# Patient Record
Sex: MALE | Race: WHITE | NOT HISPANIC OR LATINO | Employment: OTHER | URBAN - METROPOLITAN AREA
[De-identification: names, ages, dates, MRNs, and addresses within clinical notes are randomized per-mention and may not be internally consistent; named-entity substitution may affect disease eponyms.]

---

## 2017-02-20 ENCOUNTER — TELEPHONE (OUTPATIENT)
Dept: CARDIOLOGY | Facility: CLINIC | Age: 82
End: 2017-02-20

## 2017-02-20 DIAGNOSIS — I10 HTN (HYPERTENSION), BENIGN: Primary | ICD-10-CM

## 2017-02-20 NOTE — TELEPHONE ENCOUNTER
Has appointment march 14th. Wants to know if you are going to orders labs because he does them in [place].

## 2017-02-20 NOTE — TELEPHONE ENCOUNTER
----- Message from Hannah Cunningham sent at 2/20/2017 11:53 AM CST -----  Contact: [phone] or [phone] / self   Patient requesting to speak with you regarding his blood pressure medication. It is too strong sometimes in the morning. Please advise.

## 2017-02-21 NOTE — TREATMENT PLAN
Patient called regarding low BP after taking medications that leaves him feeling drained and weak. He is taking lisinopril 20 mg po daily, HCTZ 25 mg po daily, norvasc 5 mg po daily and bisoprolol 5 mg po daily. Patient advice to stop HCTZ. Will get BMP. F/u next month.

## 2017-03-09 ENCOUNTER — LAB VISIT (OUTPATIENT)
Dept: LAB | Facility: HOSPITAL | Age: 82
End: 2017-03-09
Attending: INTERNAL MEDICINE
Payer: MEDICARE

## 2017-03-09 DIAGNOSIS — I10 HTN (HYPERTENSION), BENIGN: ICD-10-CM

## 2017-03-09 LAB
ANION GAP SERPL CALC-SCNC: 9 MMOL/L
BUN SERPL-MCNC: 19 MG/DL
CALCIUM SERPL-MCNC: 9 MG/DL
CHLORIDE SERPL-SCNC: 107 MMOL/L
CO2 SERPL-SCNC: 28 MMOL/L
CREAT SERPL-MCNC: 1.21 MG/DL
EST. GFR  (AFRICAN AMERICAN): >60 ML/MIN/1.73 M^2
EST. GFR  (NON AFRICAN AMERICAN): 55.8 ML/MIN/1.73 M^2
GLUCOSE SERPL-MCNC: 110 MG/DL
POTASSIUM SERPL-SCNC: 4.2 MMOL/L
SODIUM SERPL-SCNC: 144 MMOL/L

## 2017-03-09 PROCEDURE — 36415 COLL VENOUS BLD VENIPUNCTURE: CPT | Mod: PO

## 2017-03-09 PROCEDURE — 80048 BASIC METABOLIC PNL TOTAL CA: CPT | Mod: PO

## 2017-03-14 ENCOUNTER — OFFICE VISIT (OUTPATIENT)
Dept: CARDIOLOGY | Facility: CLINIC | Age: 82
End: 2017-03-14
Payer: MEDICARE

## 2017-03-14 VITALS
SYSTOLIC BLOOD PRESSURE: 114 MMHG | DIASTOLIC BLOOD PRESSURE: 66 MMHG | WEIGHT: 186.63 LBS | HEIGHT: 68 IN | BODY MASS INDEX: 28.28 KG/M2

## 2017-03-14 DIAGNOSIS — I51.89 DIASTOLIC DYSFUNCTION: ICD-10-CM

## 2017-03-14 DIAGNOSIS — E78.5 HYPERLIPIDEMIA, UNSPECIFIED HYPERLIPIDEMIA TYPE: ICD-10-CM

## 2017-03-14 DIAGNOSIS — I10 ESSENTIAL HYPERTENSION: Primary | ICD-10-CM

## 2017-03-14 DIAGNOSIS — I63.9 CEREBRAL INFARCTION, UNSPECIFIED MECHANISM: ICD-10-CM

## 2017-03-14 DIAGNOSIS — R73.9 HYPERGLYCEMIA: ICD-10-CM

## 2017-03-14 PROCEDURE — 99214 OFFICE O/P EST MOD 30 MIN: CPT | Mod: S$PBB,,, | Performed by: INTERNAL MEDICINE

## 2017-03-14 PROCEDURE — 99999 PR PBB SHADOW E&M-EST. PATIENT-LVL III: CPT | Mod: PBBFAC,,, | Performed by: INTERNAL MEDICINE

## 2017-03-14 PROCEDURE — 99213 OFFICE O/P EST LOW 20 MIN: CPT | Mod: PBBFAC,PO | Performed by: INTERNAL MEDICINE

## 2017-03-14 RX ORDER — HYDROCHLOROTHIAZIDE 25 MG/1
25 TABLET ORAL DAILY
Qty: 30 TABLET | Refills: 11 | Status: SHIPPED | OUTPATIENT
Start: 2017-03-14 | End: 2017-04-11 | Stop reason: SDUPTHER

## 2017-03-14 RX ORDER — PANTOPRAZOLE SODIUM 40 MG/1
TABLET, DELAYED RELEASE ORAL
COMMUNITY
Start: 2017-03-09 | End: 2017-08-10

## 2017-03-14 RX ORDER — ROSUVASTATIN CALCIUM 10 MG/1
10 TABLET, COATED ORAL DAILY
Qty: 90 TABLET | Refills: 3 | Status: SHIPPED | OUTPATIENT
Start: 2017-03-14 | End: 2017-11-27

## 2017-03-14 NOTE — MR AVS SNAPSHOT
[facility]  [address], [address]  [place] [address]  Phone: [phone]                  Herber Peña   3/14/2017 10:00 AM   Office Visit    Description:  Male : 1935   Provider:  Shazia Landry MD   Department:  [facility]           Reason for Visit     Follow-up           Diagnoses this Visit        Comments    Essential hypertension    -  Primary     Cerebral infarction, unspecified mechanism         Hyperlipidemia, unspecified hyperlipidemia type         Diastolic dysfunction         Hyperglycemia                To Do List           Goals (5 Years of Data)     None       These Medications        Disp Refills Start End    rosuvastatin (CRESTOR) 10 MG tablet 90 tablet 3 3/14/2017 2017    Take 1 tablet (10 mg total) by mouth once daily. - Oral    Pharmacy: [facility]/pharmacy #[id number] - [place], [place] - [address] Ph #: [phone]       hydrochlorothiazide (HYDRODIURIL) 25 MG tablet 30 tablet 11 3/14/2017 3/14/2018    Take 1 tablet (25 mg total) by mouth once daily. - Oral    Pharmacy: [facility]/pharmacy #[id number] - [place], [place] - [address] [address] Ph #: [phone]         [facility] On [facility]     [facility] Care Line -  Assistance  Registered nurses in the [facility] provide clinical advisement, health education, appointment booking, and other advisory services.  Call for this free service at [phone].             Medications           Message regarding Medications     Verify the changes and/or additions to your medication regime listed below are the same as discussed with your clinician today.  If any of these changes or additions are incorrect, please notify your healthcare provider.        START taking these NEW medications        Refills    hydrochlorothiazide (HYDRODIURIL) 25 MG tablet 11    Sig: Take 1 tablet (25 mg total) by mouth once daily.    Class: Normal    Route: Oral           Verify that the below list of medications  "is an accurate representation of the medications you are currently taking.  If none reported, the list may be blank. If incorrect, please contact your healthcare provider. Carry this list with you in case of emergency.           Current Medications     alprazolam (XANAX) 2 MG tablet Take 2 mg by mouth nightly as needed.      amlodipine (NORVASC) 5 MG tablet Take 1 tablet (5 mg total) by mouth once daily.    bisoprolol (ZEBETA) 5 MG tablet Take 1 tablet (5 mg total) by mouth every morning.    citalopram (CELEXA) 40 MG tablet Take 60 mg by mouth once daily.      clopidogrel (PLAVIX) 75 mg tablet TAKE 1 TABLET DAILY    hydrochlorothiazide (HYDRODIURIL) 25 MG tablet Take 1 tablet (25 mg total) by mouth once daily.    lisinopril (PRINIVIL,ZESTRIL) 20 MG tablet Take 1 tablet (20 mg total) by mouth once daily.    NEXIUM 40 mg capsule Take 40 mg by mouth Daily.    pantoprazole (PROTONIX) 40 MG tablet     quetiapine (SEROQUEL XR) 300 MG Tb24 Take by mouth once daily.      rosuvastatin (CRESTOR) 10 MG tablet Take 1 tablet (10 mg total) by mouth once daily.    trazodone (DESYREL) 100 MG tablet            Clinical Reference Information           Your Vitals Were     BP Height Weight BMI       114/66 5' 8" (1.727 m) 84.6 kg (186 lb 9.6 oz) 28.37 kg/m2       Blood Pressure          Most Recent Value    BP  114/66      Allergies as of 3/14/2017     Codeine      Immunizations Administered on Date of Encounter - 3/14/2017     None      [facility] Sign-Up     Activating your [facility] account is as easy as 1-2-3!     1) Visit [email or web address], select Sign Up Now, enter this activation code and your date of birth, then select Next.  [email or web address]  Expires: 4/28/2017 10:28 AM      2) Create a username and password to use when you visit [facility] in the future and select a security question in case you lose your password and select Next.    3) Enter your e-mail address and click Sign Up!    Additional Information  If you have " questions, please e-mail [email or web address] or call [phone] to talk to our [facility] staff. Remember, [facility] is NOT to be used for urgent needs. For medical emergencies, dial 911.         Language Assistance Services     ATTENTION: Language assistance services are available, free of charge. Please call [phone].      ATENCIÓN: Si habla español, tiene a oleary disposición servicios gratuitos de asistencia lingüística. Llame al [phone].     CHÚ Ý: N?u b?n nói Ti?ng Vi?t, có các d?ch v? h? tr? ngôn ng? mi?n phí dành cho b?n. G?i s? [phone].         [facility] complies with applicable Federal civil rights laws and does not discriminate on the basis of race, color, national origin, age, disability, or sex.

## 2017-03-14 NOTE — PROGRESS NOTES
Subjective:   Patient ID:  Herber Peña is a 81 y.o. male who presents for follow-up of Follow-up      Problem List Items Addressed This Visit        Neuro    Cerebral infarction       Cardiac    HTN (hypertension) - Primary       Fluids/Electrolytes/Nutrition/GI    Hyperlipidemia       Other    Diastolic dysfunction          HPI: Patient is doing well with no acute complaints. He is here for f/u from history of CVA. He was having weakness on multiple BP medications and was advice to stop HCTZ. He said he started feeling better so he did not stop any of his medications. He is being treated for depression/anxiety.     No history of MI/CAD or stents. He denies chest pain or SOB. BP is controlled.     He try to stay active by gardening and going walking.     Review of Systems   Constitution: Negative.   HENT: Negative.    Eyes: Negative.    Cardiovascular: Negative.    Respiratory: Negative.    Endocrine: Negative.    Hematologic/Lymphatic: Negative.    Skin: Negative.    Musculoskeletal: Negative.    Gastrointestinal: Negative.    Neurological: Negative.    Psychiatric/Behavioral: Negative.        Patient's Medications   New Prescriptions    No medications on file   Previous Medications    ALPRAZOLAM (XANAX) 2 MG TABLET    Take 2 mg by mouth nightly as needed.      AMLODIPINE (NORVASC) 5 MG TABLET    Take 1 tablet (5 mg total) by mouth once daily.    BISOPROLOL (ZEBETA) 5 MG TABLET    Take 1 tablet (5 mg total) by mouth every morning.    CITALOPRAM (CELEXA) 40 MG TABLET    Take 60 mg by mouth once daily.      CLOPIDOGREL (PLAVIX) 75 MG TABLET    TAKE 1 TABLET DAILY    LISINOPRIL (PRINIVIL,ZESTRIL) 20 MG TABLET    Take 1 tablet (20 mg total) by mouth once daily.    NEXIUM 40 MG CAPSULE    Take 40 mg by mouth Daily.    PANTOPRAZOLE (PROTONIX) 40 MG TABLET        QUETIAPINE (SEROQUEL XR) 300 MG TB24    Take by mouth once daily.      ROSUVASTATIN (CRESTOR) 10 MG TABLET    Take 1 tablet (10 mg total) by mouth once  daily.    TRAZODONE (DESYREL) 100 MG TABLET       Modified Medications    No medications on file   Discontinued Medications    No medications on file       Objective:   Physical Exam   Constitutional: He is oriented to person, place, and time. He appears well-developed and well-nourished. No distress.   Examination of the digits showed no clubbing or cyanosis   HENT:   Head: Normocephalic and atraumatic.   Eyes: Conjunctivae are normal. Pupils are equal, round, and reactive to light. Right eye exhibits no discharge.   Neck: Normal range of motion. Neck supple. No JVD present. No thyromegaly present.   No carotid bruits   Cardiovascular: Normal rate, regular rhythm, S1 normal, S2 normal, normal heart sounds, intact distal pulses and normal pulses.  PMI is not displaced.  Exam reveals no gallop, no friction rub and no opening snap.    No murmur heard.  Pulmonary/Chest: Effort normal and breath sounds normal. No respiratory distress. He has no wheezes. He has no rales. He exhibits no tenderness.   Abdominal: Soft. Bowel sounds are normal. He exhibits no distension and no mass. There is no tenderness. There is no guarding.   No hepatosplenomegaly   Musculoskeletal: Normal range of motion. He exhibits no edema or tenderness.   Lymphadenopathy:     He has no cervical adenopathy.   Neurological: He is alert and oriented to person, place, and time.   Skin: Skin is warm. No rash noted. He is not diaphoretic. No erythema.   Psychiatric: He has a normal mood and affect.   Nursing note and vitals reviewed.      ECGs reviewed  LABS reviewed  Imaging including Echoes reviewed- normal ef with DD    Assessment:     1. Essential hypertension    2. Cerebral infarction, unspecified mechanism    3. Hyperlipidemia, unspecified hyperlipidemia type    4. Diastolic dysfunction        Plan:     Continue with current medications  Low salt diet  Activity as tolerated  F/u in 6 months

## 2017-04-11 RX ORDER — HYDROCHLOROTHIAZIDE 25 MG/1
25 TABLET ORAL DAILY
Qty: 90 TABLET | Refills: 3 | Status: SHIPPED | OUTPATIENT
Start: 2017-04-11 | End: 2017-08-10

## 2017-05-10 RX ORDER — CLOPIDOGREL BISULFATE 75 MG/1
75 TABLET ORAL DAILY
Qty: 90 TABLET | Refills: 2 | Status: SHIPPED | OUTPATIENT
Start: 2017-05-10 | End: 2017-09-01 | Stop reason: SDUPTHER

## 2017-06-20 ENCOUNTER — OFFICE VISIT (OUTPATIENT)
Dept: CARDIOLOGY | Facility: CLINIC | Age: 82
End: 2017-06-20
Payer: MEDICARE

## 2017-06-20 VITALS
DIASTOLIC BLOOD PRESSURE: 65 MMHG | HEIGHT: 68 IN | SYSTOLIC BLOOD PRESSURE: 132 MMHG | OXYGEN SATURATION: 96 % | HEART RATE: 51 BPM | WEIGHT: 181.13 LBS | BODY MASS INDEX: 27.45 KG/M2

## 2017-06-20 DIAGNOSIS — I63.9 CEREBRAL INFARCTION, UNSPECIFIED MECHANISM: Primary | ICD-10-CM

## 2017-06-20 DIAGNOSIS — I51.89 DIASTOLIC DYSFUNCTION: ICD-10-CM

## 2017-06-20 DIAGNOSIS — E78.2 MIXED HYPERLIPIDEMIA: ICD-10-CM

## 2017-06-20 DIAGNOSIS — I10 ESSENTIAL HYPERTENSION: ICD-10-CM

## 2017-06-20 PROCEDURE — 99999 PR PBB SHADOW E&M-EST. PATIENT-LVL III: CPT | Mod: PBBFAC,,, | Performed by: INTERNAL MEDICINE

## 2017-06-20 PROCEDURE — 1126F AMNT PAIN NOTED NONE PRSNT: CPT | Mod: ,,, | Performed by: INTERNAL MEDICINE

## 2017-06-20 PROCEDURE — 1159F MED LIST DOCD IN RCRD: CPT | Mod: ,,, | Performed by: INTERNAL MEDICINE

## 2017-06-20 PROCEDURE — 99214 OFFICE O/P EST MOD 30 MIN: CPT | Mod: S$PBB,,, | Performed by: INTERNAL MEDICINE

## 2017-06-20 PROCEDURE — 99213 OFFICE O/P EST LOW 20 MIN: CPT | Mod: PBBFAC,PO | Performed by: INTERNAL MEDICINE

## 2017-06-20 NOTE — PROGRESS NOTES
Subjective:   Patient ID:  Herber Peña is a 81 y.o. male who presents for follow-up of Follow-up      Problem List Items Addressed This Visit        Neuro    Cerebral infarction - Primary       Cardiac    HTN (hypertension)       Fluids/Electrolytes/Nutrition/GI    Hyperlipidemia       Other    Diastolic dysfunction      Other Visit Diagnoses    None.         HPI: Patient is doing well with no acute complaints. He is here for f/u from history of CVA.  He denies chest pain or SOB. BP is controlled. He is currently taking care of wife that is suffering from dementia.   He try to stay active by gardening and going walking.   He is compliant with medications.  No orthopnea or PND.     Review of Systems   Constitution: Negative.   HENT: Negative.    Eyes: Negative.    Cardiovascular: Negative.    Respiratory: Negative.    Endocrine: Negative.    Hematologic/Lymphatic: Negative.    Skin: Negative.    Musculoskeletal: Negative.    Gastrointestinal: Negative.    Neurological: Negative.    Psychiatric/Behavioral: Negative.        Patient's Medications   New Prescriptions    No medications on file   Previous Medications    ALPRAZOLAM (XANAX) 2 MG TABLET    Take 2 mg by mouth nightly as needed.      AMLODIPINE (NORVASC) 5 MG TABLET    Take 1 tablet (5 mg total) by mouth once daily.    BISOPROLOL (ZEBETA) 5 MG TABLET    Take 1 tablet (5 mg total) by mouth every morning.    CITALOPRAM (CELEXA) 40 MG TABLET    Take 60 mg by mouth once daily.      CLOPIDOGREL (PLAVIX) 75 MG TABLET    Take 1 tablet (75 mg total) by mouth once daily.    HYDROCHLOROTHIAZIDE (HYDRODIURIL) 25 MG TABLET    Take 1 tablet (25 mg total) by mouth once daily.    LISINOPRIL (PRINIVIL,ZESTRIL) 20 MG TABLET    Take 1 tablet (20 mg total) by mouth once daily.    NEXIUM 40 MG CAPSULE    Take 40 mg by mouth Daily.    PANTOPRAZOLE (PROTONIX) 40 MG TABLET        QUETIAPINE (SEROQUEL XR) 300 MG TB24    Take by mouth once daily.      ROSUVASTATIN (CRESTOR) 10 MG  TABLET    Take 1 tablet (10 mg total) by mouth once daily.    TRAZODONE (DESYREL) 100 MG TABLET       Modified Medications    No medications on file   Discontinued Medications    No medications on file       Objective:   Physical Exam   Constitutional: He is oriented to person, place, and time. He appears well-developed and well-nourished. No distress.   Examination of the digits showed no clubbing or cyanosis   HENT:   Head: Normocephalic and atraumatic.   Eyes: Conjunctivae are normal. Pupils are equal, round, and reactive to light. Right eye exhibits no discharge.   Neck: Normal range of motion. Neck supple. No JVD present. No thyromegaly present.   No carotid bruits   Cardiovascular: Normal rate, regular rhythm, S1 normal, S2 normal, normal heart sounds, intact distal pulses and normal pulses.  PMI is not displaced.  Exam reveals no gallop, no friction rub and no opening snap.    No murmur heard.  Pulmonary/Chest: Effort normal and breath sounds normal. No respiratory distress. He has no wheezes. He has no rales. He exhibits no tenderness.   Abdominal: Soft. Bowel sounds are normal. He exhibits no distension and no mass. There is no tenderness. There is no guarding.   No hepatosplenomegaly   Musculoskeletal: Normal range of motion. He exhibits no edema or tenderness.   Lymphadenopathy:     He has no cervical adenopathy.   Neurological: He is alert and oriented to person, place, and time.   Skin: Skin is warm. No rash noted. He is not diaphoretic. No erythema.   Psychiatric: He has a normal mood and affect.   Nursing note and vitals reviewed.      ECGs reviewed- sinus bradycardia  LABS reviewed  Imaging including Echoes reviewed- normal ef with DD    Assessment:     1. Cerebral infarction, unspecified mechanism    2. Essential hypertension    3. Mixed hyperlipidemia    4. Diastolic dysfunction        Plan:     Continue with current medications  Low salt diet  Activity as tolerated  F/u in 6 months

## 2017-07-05 ENCOUNTER — TELEPHONE (OUTPATIENT)
Dept: CARDIOLOGY | Facility: CLINIC | Age: 82
End: 2017-07-05

## 2017-07-05 NOTE — TELEPHONE ENCOUNTER
----- Message from Davina Nash sent at 7/5/2017  9:39 AM CDT -----  April with Dr. Manuel's office, DDS, called.    No. [phone]   Can patient discontinue taking Plavix for a procedure.    Please call.

## 2017-07-28 RX ORDER — BISOPROLOL FUMARATE 5 MG/1
5 TABLET, FILM COATED ORAL EVERY MORNING
Qty: 90 TABLET | Refills: 3 | Status: SHIPPED | OUTPATIENT
Start: 2017-07-28 | End: 2017-08-10

## 2017-07-28 RX ORDER — LISINOPRIL 20 MG/1
20 TABLET ORAL DAILY
Qty: 90 TABLET | Refills: 3 | Status: SHIPPED | OUTPATIENT
Start: 2017-07-28 | End: 2017-08-10 | Stop reason: SDUPTHER

## 2017-07-28 RX ORDER — AMLODIPINE BESYLATE 5 MG/1
5 TABLET ORAL DAILY
Qty: 90 TABLET | Refills: 3 | Status: SHIPPED | OUTPATIENT
Start: 2017-07-28 | End: 2017-09-01 | Stop reason: SDUPTHER

## 2017-08-10 ENCOUNTER — OFFICE VISIT (OUTPATIENT)
Dept: CARDIOLOGY | Facility: CLINIC | Age: 82
End: 2017-08-10
Payer: MEDICARE

## 2017-08-10 VITALS
OXYGEN SATURATION: 96 % | HEIGHT: 68 IN | HEART RATE: 45 BPM | WEIGHT: 177.31 LBS | SYSTOLIC BLOOD PRESSURE: 152 MMHG | DIASTOLIC BLOOD PRESSURE: 71 MMHG | BODY MASS INDEX: 26.87 KG/M2

## 2017-08-10 DIAGNOSIS — I51.89 DIASTOLIC DYSFUNCTION: ICD-10-CM

## 2017-08-10 DIAGNOSIS — F33.40 RECURRENT MAJOR DEPRESSIVE DISORDER, IN REMISSION: ICD-10-CM

## 2017-08-10 DIAGNOSIS — E78.2 MIXED HYPERLIPIDEMIA: ICD-10-CM

## 2017-08-10 DIAGNOSIS — I10 ESSENTIAL HYPERTENSION: Primary | ICD-10-CM

## 2017-08-10 PROCEDURE — 99214 OFFICE O/P EST MOD 30 MIN: CPT | Mod: S$PBB,,, | Performed by: INTERNAL MEDICINE

## 2017-08-10 PROCEDURE — 3077F SYST BP >= 140 MM HG: CPT | Mod: ,,, | Performed by: INTERNAL MEDICINE

## 2017-08-10 PROCEDURE — 1159F MED LIST DOCD IN RCRD: CPT | Mod: ,,, | Performed by: INTERNAL MEDICINE

## 2017-08-10 PROCEDURE — 99999 PR PBB SHADOW E&M-EST. PATIENT-LVL III: CPT | Mod: PBBFAC,,, | Performed by: INTERNAL MEDICINE

## 2017-08-10 PROCEDURE — 3078F DIAST BP <80 MM HG: CPT | Mod: ,,, | Performed by: INTERNAL MEDICINE

## 2017-08-10 PROCEDURE — 1126F AMNT PAIN NOTED NONE PRSNT: CPT | Mod: ,,, | Performed by: INTERNAL MEDICINE

## 2017-08-10 PROCEDURE — 99213 OFFICE O/P EST LOW 20 MIN: CPT | Mod: PBBFAC,PO | Performed by: INTERNAL MEDICINE

## 2017-08-10 RX ORDER — LISINOPRIL 40 MG/1
40 TABLET ORAL DAILY
Qty: 90 TABLET | Refills: 3 | Status: SHIPPED | OUTPATIENT
Start: 2017-08-10 | End: 2017-09-01 | Stop reason: SDUPTHER

## 2017-08-10 NOTE — PROGRESS NOTES
Subjective:   Patient ID:  Herber Peña is a 81 y.o. male who presents for follow-up of Follow-up      Problem List Items Addressed This Visit        Psychiatric    Depression       Cardiac/Vascular    HTN (hypertension) - Primary    Hyperlipidemia    Diastolic dysfunction      Other Visit Diagnoses    None.         HPI: Patient is doing well with no acute complaints. He is here for f/u from history of CVA.  He denies chest pain or SOB. BP is controlled.     He is currently taking care of wife that is suffering from dementia and depression  He is active working in yard and taking care of wife.    He is compliant with medications.  No orthopnea or PND.     Review of Systems   Constitution: Negative.   HENT: Negative.    Eyes: Negative.    Cardiovascular: Negative.    Respiratory: Negative.    Endocrine: Negative.    Hematologic/Lymphatic: Negative.    Skin: Negative.    Musculoskeletal: Negative.    Gastrointestinal: Negative.    Neurological: Negative.    Psychiatric/Behavioral: Negative.        Patient's Medications   New Prescriptions    No medications on file   Previous Medications    ALPRAZOLAM (XANAX) 2 MG TABLET    Take 2 mg by mouth nightly as needed.      AMLODIPINE (NORVASC) 5 MG TABLET    Take 1 tablet (5 mg total) by mouth once daily.    BISOPROLOL (ZEBETA) 5 MG TABLET    Take 1 tablet (5 mg total) by mouth every morning.    CITALOPRAM (CELEXA) 40 MG TABLET    Take 60 mg by mouth once daily.      CLOPIDOGREL (PLAVIX) 75 MG TABLET    Take 1 tablet (75 mg total) by mouth once daily.    HYDROCHLOROTHIAZIDE (HYDRODIURIL) 25 MG TABLET    Take 1 tablet (25 mg total) by mouth once daily.    LISINOPRIL (PRINIVIL,ZESTRIL) 20 MG TABLET    Take 1 tablet (20 mg total) by mouth once daily.    NEXIUM 40 MG CAPSULE    Take 40 mg by mouth Daily.    PANTOPRAZOLE (PROTONIX) 40 MG TABLET        ROSUVASTATIN (CRESTOR) 10 MG TABLET    Take 1 tablet (10 mg total) by mouth once daily.    TRAZODONE (DESYREL) 100 MG TABLET        Modified Medications    No medications on file   Discontinued Medications    QUETIAPINE (SEROQUEL XR) 300 MG TB24    Take by mouth once daily.         Objective:   Physical Exam   Constitutional: He is oriented to person, place, and time. He appears well-developed and well-nourished. No distress.   Examination of the digits showed no clubbing or cyanosis   HENT:   Head: Normocephalic and atraumatic.   Eyes: Conjunctivae are normal. Pupils are equal, round, and reactive to light. Right eye exhibits no discharge.   Neck: Normal range of motion. Neck supple. No JVD present. No thyromegaly present.   No carotid bruits   Cardiovascular: Normal rate, regular rhythm, S1 normal, S2 normal, normal heart sounds, intact distal pulses and normal pulses.  PMI is not displaced.  Exam reveals no gallop, no friction rub and no opening snap.    No murmur heard.  Pulmonary/Chest: Effort normal and breath sounds normal. No respiratory distress. He has no wheezes. He has no rales. He exhibits no tenderness.   Abdominal: Soft. Bowel sounds are normal. He exhibits no distension and no mass. There is no tenderness. There is no guarding.   No hepatosplenomegaly   Musculoskeletal: Normal range of motion. He exhibits no edema or tenderness.   Lymphadenopathy:     He has no cervical adenopathy.   Neurological: He is alert and oriented to person, place, and time.   Skin: Skin is warm. No rash noted. He is not diaphoretic. No erythema.   Psychiatric: He has a normal mood and affect.   Nursing note and vitals reviewed.      ECGs reviewed- sinus bradycardia  LABS reviewed  Imaging including Echoes reviewed- normal ef with DD    Assessment:     1. Essential hypertension    2. Mixed hyperlipidemia    3. Diastolic dysfunction    4. Recurrent major depressive disorder, in remission        Plan:     Stop zebeta secondary to fatigue and depression  Increase lisinopril to 40 mg po daily. If BP is elevated >155/>90he can take 10 mg of norvasc instead  of 5 mg  Low salt diet  Activity as tolerated  F/u in 6 months

## 2017-08-10 NOTE — PATIENT INSTRUCTIONS
Stop Zebeta  Increase lisinopril to 40 mg daily.    If BP greater than 155 top number or 95 bottom number, take extra 5 mg of norvasc

## 2017-09-01 DIAGNOSIS — I51.89 DIASTOLIC DYSFUNCTION: ICD-10-CM

## 2017-09-01 DIAGNOSIS — I10 ESSENTIAL HYPERTENSION: ICD-10-CM

## 2017-09-01 RX ORDER — AMLODIPINE BESYLATE 5 MG/1
5 TABLET ORAL DAILY
Qty: 90 TABLET | Refills: 3 | Status: SHIPPED | OUTPATIENT
Start: 2017-09-01 | End: 2017-09-14 | Stop reason: SDUPTHER

## 2017-09-01 RX ORDER — CLOPIDOGREL BISULFATE 75 MG/1
75 TABLET ORAL DAILY
Qty: 90 TABLET | Refills: 3 | Status: SHIPPED | OUTPATIENT
Start: 2017-09-01 | End: 2017-09-14 | Stop reason: SDUPTHER

## 2017-09-01 RX ORDER — LISINOPRIL 40 MG/1
40 TABLET ORAL DAILY
Qty: 90 TABLET | Refills: 3 | Status: SHIPPED | OUTPATIENT
Start: 2017-09-01 | End: 2017-09-14 | Stop reason: SDUPTHER

## 2017-09-14 DIAGNOSIS — I10 ESSENTIAL HYPERTENSION: ICD-10-CM

## 2017-09-14 DIAGNOSIS — I51.89 DIASTOLIC DYSFUNCTION: ICD-10-CM

## 2017-09-14 RX ORDER — AMLODIPINE BESYLATE 5 MG/1
5 TABLET ORAL DAILY
Qty: 90 TABLET | Refills: 3 | Status: SHIPPED | OUTPATIENT
Start: 2017-09-14 | End: 2017-10-27 | Stop reason: SDUPTHER

## 2017-09-14 RX ORDER — LISINOPRIL 40 MG/1
40 TABLET ORAL DAILY
Qty: 90 TABLET | Refills: 3 | Status: SHIPPED | OUTPATIENT
Start: 2017-09-14 | End: 2017-10-18 | Stop reason: SDUPTHER

## 2017-09-14 RX ORDER — CLOPIDOGREL BISULFATE 75 MG/1
75 TABLET ORAL DAILY
Qty: 90 TABLET | Refills: 3 | Status: SHIPPED | OUTPATIENT
Start: 2017-09-14 | End: 2021-11-17

## 2017-10-04 ENCOUNTER — TELEPHONE (OUTPATIENT)
Dept: CARDIOLOGY | Facility: CLINIC | Age: 82
End: 2017-10-04

## 2017-10-04 NOTE — TELEPHONE ENCOUNTER
----- Message from Irais Brown sent at 10/4/2017 10:42 AM CDT -----  Contact: Self [phone]  Patient is calling to talk to nurse concerning his blood pressure. Please advice

## 2017-10-05 ENCOUNTER — TELEPHONE (OUTPATIENT)
Dept: CARDIOLOGY | Facility: CLINIC | Age: 82
End: 2017-10-05

## 2017-10-05 NOTE — TELEPHONE ENCOUNTER
----- Message from Irais Brown sent at 10/5/2017 11:17 AM CDT -----  Contact: Self [phone]  Patient Returning Your Phone Call

## 2017-10-05 NOTE — TELEPHONE ENCOUNTER
Patient stated that since you changed his Lisinopril from 20 to 40mg he has been having headaches and is too sedated and slightly dizzy.  Patient states that he would like to go back on 20mg of Lisinopril, and 5mg of Norvasc and 5mg of zebeta and wants to know what you would advise.

## 2017-10-18 DIAGNOSIS — I10 ESSENTIAL HYPERTENSION: ICD-10-CM

## 2017-10-18 DIAGNOSIS — I51.89 DIASTOLIC DYSFUNCTION: ICD-10-CM

## 2017-10-18 RX ORDER — LISINOPRIL 40 MG/1
40 TABLET ORAL DAILY
Qty: 90 TABLET | Refills: 3 | Status: SHIPPED | OUTPATIENT
Start: 2017-10-18 | End: 2018-04-02 | Stop reason: ALTCHOICE

## 2017-10-23 ENCOUNTER — TELEPHONE (OUTPATIENT)
Dept: CARDIOLOGY | Facility: CLINIC | Age: 82
End: 2017-10-23

## 2017-10-23 NOTE — TELEPHONE ENCOUNTER
----- Message from Verónica Galaviz sent at 10/23/2017 11:12 AM CDT -----  Contact: self, [phone]  Patient called in requesting to speak with you, states he is having issues with blood pressure medication.States he had a rough time last night.  Please advise.

## 2017-10-26 ENCOUNTER — TELEPHONE (OUTPATIENT)
Dept: CARDIOLOGY | Facility: CLINIC | Age: 82
End: 2017-10-26

## 2017-10-26 NOTE — TELEPHONE ENCOUNTER
Patient called to say the his blood pressure has been going up throughout the day and he has been taking norvasc 5 mg three times a day when he feels his BP rising.      In the morning he takes:   Lisinopril 40mg & Norvasc 5mg     then around 3PM his BP is in the 150's and the bottom number is good then he takes  Norvasc 5mg     and then again at bedtime his BP is 155 and he takes another norvasc 5Mg    Patients stated that he needed guidance concerning his BP   Please advise.

## 2017-10-26 NOTE — TELEPHONE ENCOUNTER
----- Message from Jane Mcallister sent at 10/26/2017 10:51 AM CDT -----  Contact: [phone] cell/self  Patient requesting to speak with you regarding his blood pressure medication. Please call on his cell and advise.

## 2017-10-27 DIAGNOSIS — I10 HTN (HYPERTENSION), BENIGN: Primary | ICD-10-CM

## 2017-10-27 RX ORDER — HYDROCHLOROTHIAZIDE 12.5 MG/1
12.5 TABLET ORAL DAILY
Qty: 30 TABLET | Refills: 11 | Status: SHIPPED | OUTPATIENT
Start: 2017-10-27 | End: 2017-11-28 | Stop reason: SDUPTHER

## 2017-10-27 RX ORDER — AMLODIPINE BESYLATE 5 MG/1
10 TABLET ORAL DAILY
Qty: 90 TABLET | Refills: 3 | Status: SHIPPED | OUTPATIENT
Start: 2017-10-27 | End: 2017-11-07

## 2017-10-27 NOTE — TREATMENT PLAN
Patient's BP is running high on norvasc 5 mg and lisinopril 40 mg so he has been taking an extra 10 mg of norvasc. Patient advice to increase norvasc to 10 mg po daily and I will add HCTZ 12.5 mg po daily.

## 2017-11-07 ENCOUNTER — OFFICE VISIT (OUTPATIENT)
Dept: INTERNAL MEDICINE | Facility: CLINIC | Age: 82
End: 2017-11-07
Payer: MEDICARE

## 2017-11-07 VITALS
DIASTOLIC BLOOD PRESSURE: 64 MMHG | HEIGHT: 66 IN | HEART RATE: 60 BPM | SYSTOLIC BLOOD PRESSURE: 156 MMHG | BODY MASS INDEX: 28.98 KG/M2 | TEMPERATURE: 97 F | WEIGHT: 180.31 LBS | RESPIRATION RATE: 16 BRPM

## 2017-11-07 DIAGNOSIS — I10 ESSENTIAL HYPERTENSION: Primary | ICD-10-CM

## 2017-11-07 DIAGNOSIS — I67.9 CVD (CEREBROVASCULAR DISEASE): ICD-10-CM

## 2017-11-07 DIAGNOSIS — N18.30 CKD (CHRONIC KIDNEY DISEASE), STAGE III: ICD-10-CM

## 2017-11-07 DIAGNOSIS — Z23 NEED FOR PROPHYLACTIC VACCINATION AGAINST STREPTOCOCCUS PNEUMONIAE (PNEUMOCOCCUS): ICD-10-CM

## 2017-11-07 DIAGNOSIS — F33.40 RECURRENT MAJOR DEPRESSIVE DISORDER, IN REMISSION: ICD-10-CM

## 2017-11-07 DIAGNOSIS — E78.00 PURE HYPERCHOLESTEROLEMIA: ICD-10-CM

## 2017-11-07 DIAGNOSIS — Z23 NEED FOR PROPHYLACTIC VACCINATION AND INOCULATION AGAINST INFLUENZA: ICD-10-CM

## 2017-11-07 LAB
BILIRUB SERPL-MCNC: NEGATIVE MG/DL
BLOOD URINE, POC: ABNORMAL
COLOR, POC UA: YELLOW
GLUCOSE UR QL STRIP: ABNORMAL
KETONES UR QL STRIP: ABNORMAL
LEUKOCYTE ESTERASE URINE, POC: ABNORMAL
NITRITE, POC UA: ABNORMAL
PH, POC UA: ABNORMAL
PROTEIN, POC: ABNORMAL
SPECIFIC GRAVITY, POC UA: 1.02
UROBILINOGEN, POC UA: 0.2

## 2017-11-07 PROCEDURE — 81002 URINALYSIS NONAUTO W/O SCOPE: CPT | Mod: PBBFAC,PN | Performed by: INTERNAL MEDICINE

## 2017-11-07 PROCEDURE — 93010 ELECTROCARDIOGRAM REPORT: CPT | Mod: ,,, | Performed by: INTERNAL MEDICINE

## 2017-11-07 PROCEDURE — 90670 PCV13 VACCINE IM: CPT | Mod: PBBFAC,PN

## 2017-11-07 PROCEDURE — 99203 OFFICE O/P NEW LOW 30 MIN: CPT | Mod: S$PBB,,, | Performed by: INTERNAL MEDICINE

## 2017-11-07 PROCEDURE — 99213 OFFICE O/P EST LOW 20 MIN: CPT | Mod: PBBFAC,PN | Performed by: INTERNAL MEDICINE

## 2017-11-07 PROCEDURE — G0009 ADMIN PNEUMOCOCCAL VACCINE: HCPCS | Mod: PBBFAC,PN

## 2017-11-07 PROCEDURE — G0008 ADMIN INFLUENZA VIRUS VAC: HCPCS | Mod: PBBFAC,PN

## 2017-11-07 PROCEDURE — 93005 ELECTROCARDIOGRAM TRACING: CPT | Mod: PBBFAC,PN | Performed by: INTERNAL MEDICINE

## 2017-11-07 PROCEDURE — 99999 PR PBB SHADOW E&M-EST. PATIENT-LVL III: CPT | Mod: PBBFAC,,, | Performed by: INTERNAL MEDICINE

## 2017-11-07 RX ORDER — ALPRAZOLAM 1 MG/1
TABLET ORAL
COMMUNITY
Start: 2017-10-17 | End: 2017-11-07 | Stop reason: DRUGHIGH

## 2017-11-07 RX ORDER — VARDENAFIL HYDROCHLORIDE 20 MG/1
TABLET, FILM COATED ORAL
Refills: 3 | COMMUNITY
Start: 2017-10-14 | End: 2018-01-08

## 2017-11-07 RX ORDER — BISOPROLOL FUMARATE 5 MG/1
5 TABLET, FILM COATED ORAL DAILY
COMMUNITY
End: 2017-11-28 | Stop reason: SDUPTHER

## 2017-11-07 RX ORDER — QUETIAPINE FUMARATE 25 MG/1
25 TABLET, FILM COATED ORAL NIGHTLY
Refills: 0 | COMMUNITY
Start: 2017-09-20 | End: 2017-11-07

## 2017-11-07 RX ORDER — TRAMADOL HYDROCHLORIDE 50 MG/1
50 TABLET ORAL EVERY 8 HOURS PRN
COMMUNITY
End: 2017-11-27

## 2017-11-07 RX ORDER — PANTOPRAZOLE SODIUM 40 MG/1
40 TABLET, DELAYED RELEASE ORAL DAILY
COMMUNITY
Start: 2017-10-16

## 2017-11-07 RX ORDER — MAG HYDROX/ALUMINUM HYD/SIMETH 400-400-40
SUSPENSION, ORAL (FINAL DOSE FORM) ORAL NIGHTLY
COMMUNITY

## 2017-11-07 RX ORDER — AMLODIPINE BESYLATE 10 MG/1
10 TABLET ORAL DAILY
Qty: 90 TABLET | Refills: 3 | Status: SHIPPED | OUTPATIENT
Start: 2017-11-07 | End: 2017-11-09 | Stop reason: SDUPTHER

## 2017-11-07 NOTE — PROGRESS NOTES
Subjective:       Patient ID: Herber Peña is a 81 y.o. male.    Chief Complaint: Blood Pressure Check; Establish Care; and Flu Vaccine    HPI  Pt establishing care.  Pt's BP has been elevated since stopped B-blocker this summer.  Has sBPs in 150s.  Has mild headaches, but denies CP, sOB, palpitations.  Has some insomnia and occasional panic attacks.  Denies any deficits from old CVA.  Has some constipation.  No falls.  Review of Systems   All other systems reviewed and are negative.      Objective:      Physical Exam   Constitutional: He appears well-developed. No distress.   HENT:   Head: Normocephalic.   Eyes: EOM are normal. No scleral icterus.   Neck: Normal range of motion. No tracheal deviation present.   Cardiovascular: Normal rate, regular rhythm, normal heart sounds and intact distal pulses.    Pulmonary/Chest: Effort normal and breath sounds normal. No respiratory distress.   Abdominal: Soft. Bowel sounds are normal. He exhibits no distension.   Musculoskeletal: Normal range of motion. He exhibits no edema.   Neurological: He is alert. He exhibits normal muscle tone.   Skin: Skin is warm and dry. No rash noted. He is not diaphoretic. No erythema.   Psychiatric: He has a normal mood and affect. His behavior is normal.   Vitals reviewed.      Assessment:       1. Essential hypertension    2. Recurrent major depressive disorder, in remission    3. Pure hypercholesterolemia    4. CKD (chronic kidney disease), stage III    5. Need for prophylactic vaccination and inoculation against influenza    6. Need for prophylactic vaccination against Streptococcus pneumoniae (pneumococcus)    7. CVD (cerebrovascular disease)        Plan:       Herber was seen today for blood pressure check, establish care and flu vaccine.    Diagnoses and all orders for this visit:    Essential hypertension  -     amLODIPine (NORVASC) 10 MG tablet; Take 1 tablet (10 mg total) by mouth once daily.   Cont other rx    Recurrent major  depressive disorder, in remission   Doing well    Pure hypercholesterolemia  -     Lipid panel; Future    CKD (chronic kidney disease), stage III   Observe    Need for prophylactic vaccination and inoculation against influenza  -     Influenza - High Dose (65+) (PF) (IM)    Need for prophylactic vaccination against Streptococcus pneumoniae (pneumococcus)  -     Pneumococcal Conjugate Vaccine (13 Valent) (IM)      Return in about 1 month (around 12/7/2017).

## 2017-11-09 DIAGNOSIS — I10 ESSENTIAL HYPERTENSION: ICD-10-CM

## 2017-11-09 RX ORDER — AMLODIPINE BESYLATE 10 MG/1
10 TABLET ORAL DAILY
Qty: 90 TABLET | Refills: 3 | Status: SHIPPED | OUTPATIENT
Start: 2017-11-09 | End: 2018-03-08 | Stop reason: SDUPTHER

## 2017-11-09 NOTE — TELEPHONE ENCOUNTER
----- Message from Kayla Durant sent at 11/9/2017 11:34 AM CST -----  Would like for the Norvasc 10 mg 1 po daily to go to [facility] instead of [facility] . And can he get a 3 month supply because it will be cheaper.

## 2017-11-20 ENCOUNTER — HOSPITAL ENCOUNTER (OUTPATIENT)
Dept: CARDIOLOGY | Facility: HOSPITAL | Age: 82
Discharge: HOME | End: 2017-11-20
Attending: INTERNAL MEDICINE
Payer: MEDICARE

## 2017-11-20 DIAGNOSIS — I10 ESSENTIAL HYPERTENSION: ICD-10-CM

## 2017-11-20 PROCEDURE — 93010 ELECTROCARDIOGRAM REPORT: CPT | Mod: ,,, | Performed by: INTERNAL MEDICINE

## 2017-11-20 PROCEDURE — 93005 ELECTROCARDIOGRAM TRACING: CPT | Mod: PO

## 2017-11-27 ENCOUNTER — OFFICE VISIT (OUTPATIENT)
Dept: INTERNAL MEDICINE | Facility: CLINIC | Age: 82
End: 2017-11-27
Payer: MEDICARE

## 2017-11-27 VITALS
BODY MASS INDEX: 28.38 KG/M2 | WEIGHT: 176.56 LBS | DIASTOLIC BLOOD PRESSURE: 70 MMHG | RESPIRATION RATE: 12 BRPM | HEART RATE: 60 BPM | TEMPERATURE: 98 F | SYSTOLIC BLOOD PRESSURE: 166 MMHG | HEIGHT: 66 IN

## 2017-11-27 DIAGNOSIS — I10 ESSENTIAL HYPERTENSION: Primary | ICD-10-CM

## 2017-11-27 PROCEDURE — 99999 PR PBB SHADOW E&M-EST. PATIENT-LVL IV: CPT | Mod: PBBFAC,,, | Performed by: INTERNAL MEDICINE

## 2017-11-27 PROCEDURE — 99214 OFFICE O/P EST MOD 30 MIN: CPT | Mod: PBBFAC,PN | Performed by: INTERNAL MEDICINE

## 2017-11-27 PROCEDURE — 99213 OFFICE O/P EST LOW 20 MIN: CPT | Mod: S$PBB,,, | Performed by: INTERNAL MEDICINE

## 2017-11-27 RX ORDER — CLONIDINE 0.1 MG/24H
1 PATCH, EXTENDED RELEASE TRANSDERMAL
Qty: 12 PATCH | Refills: 4 | Status: SHIPPED | OUTPATIENT
Start: 2017-11-27 | End: 2018-08-23 | Stop reason: SDUPTHER

## 2017-11-27 NOTE — PROGRESS NOTES
Subjective:       Patient ID: Herber Peña is a 81 y.o. male.    Chief Complaint: Blood Pressure Check    HPI  Pt with 3 days of elevated SBPs in 170s throughout the day, c/o HA.  Denies CP, SOB.  Anxious.  Review of Systems    Objective:      Physical Exam   Constitutional: He appears well-developed. No distress.   HENT:   Head: Normocephalic.   Eyes: EOM are normal. No scleral icterus.   Neck: Normal range of motion. No tracheal deviation present.   Cardiovascular: Normal rate, regular rhythm, normal heart sounds and intact distal pulses.    Pulmonary/Chest: Effort normal and breath sounds normal. No respiratory distress.   Abdominal: Soft. Bowel sounds are normal. He exhibits no distension. There is no tenderness.   Musculoskeletal: Normal range of motion. He exhibits no edema.   Neurological: He is alert.   Skin: Skin is warm and dry. No rash noted. He is not diaphoretic. No erythema.   Psychiatric: He has a normal mood and affect. His behavior is normal.   Vitals reviewed.      Assessment:       1. Essential hypertension        Plan:       Herber was seen today for blood pressure check.    Diagnoses and all orders for this visit:    Essential hypertension  -     cloNIDine 0.1 mg/24 hr td ptwk (CATAPRES) 0.1 mg/24 hr; Place 1 patch onto the skin every 7 days.   Cont other rx   Check BP qAM only    Return in about 5 weeks (around 1/1/2018).

## 2017-11-28 ENCOUNTER — TELEPHONE (OUTPATIENT)
Dept: INTERNAL MEDICINE | Facility: CLINIC | Age: 82
End: 2017-11-28

## 2017-11-28 DIAGNOSIS — I10 HTN (HYPERTENSION), BENIGN: ICD-10-CM

## 2017-11-28 RX ORDER — HYDROCHLOROTHIAZIDE 12.5 MG/1
12.5 TABLET ORAL DAILY
Qty: 30 TABLET | Refills: 11 | Status: SHIPPED | OUTPATIENT
Start: 2017-11-28 | End: 2017-11-28 | Stop reason: SDUPTHER

## 2017-11-28 RX ORDER — BISOPROLOL FUMARATE 5 MG/1
5 TABLET, FILM COATED ORAL DAILY
Qty: 90 TABLET | Refills: 3 | Status: SHIPPED | OUTPATIENT
Start: 2017-11-28 | End: 2018-04-02 | Stop reason: ALTCHOICE

## 2017-11-28 RX ORDER — HYDROCHLOROTHIAZIDE 12.5 MG/1
12.5 TABLET ORAL DAILY
Qty: 30 TABLET | Refills: 11 | Status: SHIPPED | OUTPATIENT
Start: 2017-11-28 | End: 2018-01-03 | Stop reason: SDUPTHER

## 2017-11-28 NOTE — TELEPHONE ENCOUNTER
----- Message from Lara Guerrero sent at 11/28/2017  2:24 PM CST -----  Contact: The pt  Need refills sent to [facility]    hydroCHLOROthiazide (HYDRODIURIL) 12.5 MG Tab  bisoprolol (ZEBETA) 5 MG tablet

## 2017-12-06 DIAGNOSIS — I63.89 CEREBRAL INFARCTION DUE TO OTHER MECHANISM: ICD-10-CM

## 2017-12-06 DIAGNOSIS — E78.5 HYPERLIPIDEMIA, UNSPECIFIED HYPERLIPIDEMIA TYPE: ICD-10-CM

## 2017-12-06 DIAGNOSIS — I10 ESSENTIAL HYPERTENSION: ICD-10-CM

## 2017-12-06 DIAGNOSIS — R73.9 HYPERGLYCEMIA: ICD-10-CM

## 2017-12-07 RX ORDER — ROSUVASTATIN CALCIUM 10 MG/1
TABLET, COATED ORAL
Qty: 90 TABLET | Refills: 3 | Status: SHIPPED | OUTPATIENT
Start: 2017-12-07 | End: 2018-12-02 | Stop reason: SDUPTHER

## 2018-01-03 DIAGNOSIS — I10 HTN (HYPERTENSION), BENIGN: ICD-10-CM

## 2018-01-03 RX ORDER — HYDROCHLOROTHIAZIDE 12.5 MG/1
12.5 TABLET ORAL DAILY
Qty: 90 TABLET | Refills: 3 | Status: SHIPPED | OUTPATIENT
Start: 2018-01-03 | End: 2018-03-01 | Stop reason: SDUPTHER

## 2018-01-03 NOTE — TELEPHONE ENCOUNTER
----- Message from Leona Gannon sent at 1/2/2018 10:05 AM CST -----  Contact: self/ [phone] or [phone]  Patient called in to get prescription refill of hydroCHLOROthiazide (HYDRODIURIL) 12.5 MG Tab. He needs 30 day supply prescription to be sent via fax to [facility].    Fax # [phone]    Please call and advise.

## 2018-01-08 ENCOUNTER — OFFICE VISIT (OUTPATIENT)
Dept: INTERNAL MEDICINE | Facility: CLINIC | Age: 83
End: 2018-01-08
Payer: MEDICARE

## 2018-01-08 VITALS
HEART RATE: 68 BPM | RESPIRATION RATE: 12 BRPM | HEIGHT: 66 IN | DIASTOLIC BLOOD PRESSURE: 60 MMHG | TEMPERATURE: 99 F | WEIGHT: 179.69 LBS | BODY MASS INDEX: 28.88 KG/M2 | SYSTOLIC BLOOD PRESSURE: 108 MMHG

## 2018-01-08 DIAGNOSIS — I10 ESSENTIAL HYPERTENSION: Primary | ICD-10-CM

## 2018-01-08 DIAGNOSIS — E78.00 PURE HYPERCHOLESTEROLEMIA: ICD-10-CM

## 2018-01-08 DIAGNOSIS — F33.40 RECURRENT MAJOR DEPRESSIVE DISORDER, IN REMISSION: ICD-10-CM

## 2018-01-08 DIAGNOSIS — N18.30 CKD (CHRONIC KIDNEY DISEASE), STAGE III: ICD-10-CM

## 2018-01-08 PROCEDURE — 99213 OFFICE O/P EST LOW 20 MIN: CPT | Mod: PBBFAC,PN | Performed by: INTERNAL MEDICINE

## 2018-01-08 PROCEDURE — 99213 OFFICE O/P EST LOW 20 MIN: CPT | Mod: S$PBB,,, | Performed by: INTERNAL MEDICINE

## 2018-01-08 PROCEDURE — 99999 PR PBB SHADOW E&M-EST. PATIENT-LVL III: CPT | Mod: PBBFAC,,, | Performed by: INTERNAL MEDICINE

## 2018-01-08 RX ORDER — SERTRALINE HYDROCHLORIDE 50 MG/1
25 TABLET, FILM COATED ORAL DAILY
COMMUNITY
Start: 2018-01-02 | End: 2018-04-02 | Stop reason: ALTCHOICE

## 2018-01-08 NOTE — PROGRESS NOTES
Subjective:       Patient ID: Herber Peña is a 82 y.o. male.    Chief Complaint: Follow-up    HPI  Recheck for BP.  Feels better.  No HA.  Now on Xanax and Zoloft for depression and anxiety.  Review of Systems    Objective:      Physical Exam   Constitutional: He appears well-developed. No distress.   HENT:   Head: Normocephalic.   Eyes: EOM are normal. No scleral icterus.   Neck: Normal range of motion. No tracheal deviation present.   Cardiovascular: Normal rate, regular rhythm and intact distal pulses.    Pulmonary/Chest: Effort normal. No respiratory distress.   Abdominal: He exhibits no distension.   Musculoskeletal: Normal range of motion. He exhibits no edema.   Neurological: He is alert.   Skin: Skin is warm and dry. No rash noted. He is not diaphoretic. No erythema.   Psychiatric: He has a normal mood and affect. His behavior is normal.   Vitals reviewed.      Assessment:       1. Essential hypertension    2. Recurrent major depressive disorder, in remission    3. Pure hypercholesterolemia    4. CKD (chronic kidney disease), stage III        Plan:       Herber was seen today for follow-up.    Diagnoses and all orders for this visit:    Essential hypertension  -     Well-cont      Recurrent major depressive disorder, in remission   Well-cont    Pure hypercholesterolemia   Well-cont    CKD (chronic kidney disease), stage III   CBC, CMP    Return in about 6 months (around 7/8/2018).

## 2018-01-08 NOTE — PROGRESS NOTES
Patient, Herber Peña (MRN #[id number]), presented with a recent Platelet count less than 150 K/uL consistent with the definition of thrombocytopenia (ICD10 - D69.6).    Platelets   Date Value Ref Range Status   03/10/2016 138 (L) 150 - 350 K/uL Final     The patient's thrombocytopenia was monitored, evaluated, addressed and/or treated. This addendum to the medical record is made on 01/08/2018.

## 2018-03-01 DIAGNOSIS — I10 HTN (HYPERTENSION), BENIGN: ICD-10-CM

## 2018-03-01 RX ORDER — HYDROCHLOROTHIAZIDE 12.5 MG/1
12.5 TABLET ORAL DAILY
Qty: 90 TABLET | Refills: 3 | Status: SHIPPED | OUTPATIENT
Start: 2018-03-01 | End: 2018-03-05

## 2018-03-01 NOTE — TELEPHONE ENCOUNTER
----- Message from Jane Mcallister sent at 3/1/2018 10:17 AM CST -----  Contact: [phone]/self  Patient requesting a 90 day supply prescription for hydroCHLOROthiazide (HYDRODIURIL) 12.5 MG Tab. [facility]. Please advise.

## 2018-03-02 RX ORDER — QUETIAPINE FUMARATE 25 MG/1
25 TABLET, FILM COATED ORAL NIGHTLY
Refills: 0 | COMMUNITY
Start: 2017-12-06 | End: 2018-04-02 | Stop reason: ALTCHOICE

## 2018-03-02 RX ORDER — HYDROCHLOROTHIAZIDE 25 MG/1
TABLET ORAL
COMMUNITY
Start: 2018-02-25 | End: 2018-03-02 | Stop reason: SDUPTHER

## 2018-03-02 RX ORDER — CITALOPRAM 10 MG/1
10 TABLET ORAL DAILY
Refills: 0 | COMMUNITY
Start: 2017-12-06 | End: 2018-04-02 | Stop reason: ALTCHOICE

## 2018-03-02 RX ORDER — HYDRALAZINE HYDROCHLORIDE 50 MG/1
TABLET, FILM COATED ORAL
Refills: 6 | COMMUNITY
Start: 2018-01-22 | End: 2018-04-02 | Stop reason: ALTCHOICE

## 2018-03-02 RX ORDER — VARDENAFIL HYDROCHLORIDE 20 MG/1
TABLET, FILM COATED ORAL
Refills: 3 | COMMUNITY
Start: 2018-01-29 | End: 2018-04-02

## 2018-03-02 RX ORDER — ALPRAZOLAM 1 MG/1
1 TABLET ORAL 2 TIMES DAILY PRN
COMMUNITY
Start: 2018-01-01

## 2018-03-05 RX ORDER — HYDROCHLOROTHIAZIDE 25 MG/1
25 TABLET ORAL DAILY
Qty: 90 TABLET | Refills: 3 | Status: SHIPPED | OUTPATIENT
Start: 2018-03-05 | End: 2019-02-10 | Stop reason: SDUPTHER

## 2018-03-08 DIAGNOSIS — I10 ESSENTIAL HYPERTENSION: ICD-10-CM

## 2018-03-08 RX ORDER — AMLODIPINE BESYLATE 10 MG/1
10 TABLET ORAL DAILY
Qty: 10 TABLET | Refills: 0 | Status: SHIPPED | OUTPATIENT
Start: 2018-03-08 | End: 2018-07-17 | Stop reason: SDUPTHER

## 2018-03-08 NOTE — TELEPHONE ENCOUNTER
----- Message from Deena Jha sent at 3/8/2018  9:41 AM CST -----  Contact: self / [phone]  Patient is requesting a call back regarding he has medication [facility] for a 90 day supply for his high blood pressure medication. He ask that you call the below Pharmacy for a 1 week supply until the other medication arrives. Please advise    Send to :   Pharmacy     [id number] - [place], [place] - [address]

## 2018-05-23 PROBLEM — R80.9 MICROALBUMINURIA: Status: ACTIVE | Noted: 2018-05-23

## 2018-05-23 PROBLEM — N18.30 ANEMIA OF CHRONIC RENAL FAILURE, STAGE 3 (MODERATE): Status: ACTIVE | Noted: 2018-05-23

## 2018-05-23 PROBLEM — D63.1 ANEMIA OF CHRONIC RENAL FAILURE, STAGE 3 (MODERATE): Status: ACTIVE | Noted: 2018-05-23

## 2018-05-23 PROBLEM — D50.8 IRON DEFICIENCY ANEMIA SECONDARY TO INADEQUATE DIETARY IRON INTAKE: Status: ACTIVE | Noted: 2018-05-23

## 2018-07-02 ENCOUNTER — LAB VISIT (OUTPATIENT)
Dept: LAB | Facility: HOSPITAL | Age: 83
End: 2018-07-02
Attending: INTERNAL MEDICINE
Payer: MEDICARE

## 2018-07-02 DIAGNOSIS — I10 ESSENTIAL HYPERTENSION: ICD-10-CM

## 2018-07-02 LAB
ALBUMIN SERPL BCP-MCNC: 3.8 G/DL
ALP SERPL-CCNC: 90 U/L
ALT SERPL W/O P-5'-P-CCNC: 12 U/L
ANION GAP SERPL CALC-SCNC: 9 MMOL/L
AST SERPL-CCNC: 12 U/L
BASOPHILS # BLD AUTO: 0.03 K/UL
BASOPHILS NFR BLD: 0.4 %
BILIRUB SERPL-MCNC: 0.2 MG/DL
BUN SERPL-MCNC: 20 MG/DL
CALCIUM SERPL-MCNC: 8.9 MG/DL
CHLORIDE SERPL-SCNC: 102 MMOL/L
CO2 SERPL-SCNC: 29 MMOL/L
CREAT SERPL-MCNC: 1 MG/DL
DIFFERENTIAL METHOD: ABNORMAL
EOSINOPHIL # BLD AUTO: 0.2 K/UL
EOSINOPHIL NFR BLD: 3.4 %
ERYTHROCYTE [DISTWIDTH] IN BLOOD BY AUTOMATED COUNT: 12.7 %
EST. GFR  (AFRICAN AMERICAN): >60 ML/MIN/1.73 M^2
EST. GFR  (NON AFRICAN AMERICAN): >60 ML/MIN/1.73 M^2
GLUCOSE SERPL-MCNC: 154 MG/DL
HCT VFR BLD AUTO: 40.2 %
HGB BLD-MCNC: 13.7 G/DL
LYMPHOCYTES # BLD AUTO: 1.3 K/UL
LYMPHOCYTES NFR BLD: 17.8 %
MCH RBC QN AUTO: 31.2 PG
MCHC RBC AUTO-ENTMCNC: 34.1 G/DL
MCV RBC AUTO: 92 FL
MONOCYTES # BLD AUTO: 0.5 K/UL
MONOCYTES NFR BLD: 7.5 %
NEUTROPHILS # BLD AUTO: 5 K/UL
NEUTROPHILS NFR BLD: 70.8 %
PLATELET # BLD AUTO: 134 K/UL
PMV BLD AUTO: 10.6 FL
POTASSIUM SERPL-SCNC: 3.9 MMOL/L
PROT SERPL-MCNC: 6.2 G/DL
RBC # BLD AUTO: 4.39 M/UL
SODIUM SERPL-SCNC: 140 MMOL/L
WBC # BLD AUTO: 7.03 K/UL

## 2018-07-02 PROCEDURE — 36415 COLL VENOUS BLD VENIPUNCTURE: CPT | Mod: PO

## 2018-07-02 PROCEDURE — 85025 COMPLETE CBC W/AUTO DIFF WBC: CPT | Mod: PO

## 2018-07-02 PROCEDURE — 80053 COMPREHEN METABOLIC PANEL: CPT | Mod: PO

## 2018-07-09 ENCOUNTER — TELEPHONE (OUTPATIENT)
Dept: INTERNAL MEDICINE | Facility: CLINIC | Age: 83
End: 2018-07-09

## 2018-07-09 ENCOUNTER — OFFICE VISIT (OUTPATIENT)
Dept: INTERNAL MEDICINE | Facility: CLINIC | Age: 83
End: 2018-07-09
Payer: MEDICARE

## 2018-07-09 VITALS
DIASTOLIC BLOOD PRESSURE: 60 MMHG | HEIGHT: 68 IN | OXYGEN SATURATION: 97 % | HEART RATE: 64 BPM | WEIGHT: 180 LBS | BODY MASS INDEX: 27.28 KG/M2 | TEMPERATURE: 98 F | SYSTOLIC BLOOD PRESSURE: 116 MMHG

## 2018-07-09 DIAGNOSIS — E78.00 PURE HYPERCHOLESTEROLEMIA: ICD-10-CM

## 2018-07-09 DIAGNOSIS — D69.6 THROMBOCYTOPENIA: ICD-10-CM

## 2018-07-09 DIAGNOSIS — I10 ESSENTIAL HYPERTENSION: Primary | ICD-10-CM

## 2018-07-09 DIAGNOSIS — G47.10 HYPERSOMNOLENCE: ICD-10-CM

## 2018-07-09 DIAGNOSIS — R73.9 HYPERGLYCEMIA: ICD-10-CM

## 2018-07-09 DIAGNOSIS — F13.20 BENZODIAZEPINE DEPENDENCE: ICD-10-CM

## 2018-07-09 DIAGNOSIS — I67.9 CVD (CEREBROVASCULAR DISEASE): ICD-10-CM

## 2018-07-09 DIAGNOSIS — F33.40 RECURRENT MAJOR DEPRESSIVE DISORDER, IN REMISSION: ICD-10-CM

## 2018-07-09 DIAGNOSIS — M79.10 MYALGIA: ICD-10-CM

## 2018-07-09 PROBLEM — D50.8 IRON DEFICIENCY ANEMIA SECONDARY TO INADEQUATE DIETARY IRON INTAKE: Status: RESOLVED | Noted: 2018-05-23 | Resolved: 2018-07-09

## 2018-07-09 PROBLEM — D63.1 ANEMIA OF CHRONIC RENAL FAILURE, STAGE 3 (MODERATE): Status: RESOLVED | Noted: 2018-05-23 | Resolved: 2018-07-09

## 2018-07-09 PROBLEM — N18.30 ANEMIA OF CHRONIC RENAL FAILURE, STAGE 3 (MODERATE): Status: RESOLVED | Noted: 2018-05-23 | Resolved: 2018-07-09

## 2018-07-09 PROCEDURE — 99214 OFFICE O/P EST MOD 30 MIN: CPT | Mod: PBBFAC,PN | Performed by: INTERNAL MEDICINE

## 2018-07-09 PROCEDURE — 99999 PR PBB SHADOW E&M-EST. PATIENT-LVL IV: CPT | Mod: PBBFAC,,, | Performed by: INTERNAL MEDICINE

## 2018-07-09 PROCEDURE — 99215 OFFICE O/P EST HI 40 MIN: CPT | Mod: S$PBB,,, | Performed by: INTERNAL MEDICINE

## 2018-07-09 NOTE — PROGRESS NOTES
Subjective:       Patient ID: Herber Peña is a 82 y.o. male.    Chief Complaint: Follow-up    HPI  Checkup.  Labs reviewed.  Pt c/o AM dull HA for months, wakes up feeling tired, has hypersomnolence during the day.  No witnessed apneas.  No visual disturbances. Had psych meds adjusted 1 month ago.  No definite depression.  Also c/o myalgias in arms, legs.  No CP, SOB.  No new weakness.  Review of Systems   All other systems reviewed and are negative.      Objective:      Physical Exam   Constitutional: He appears well-developed. No distress.   HENT:   Head: Normocephalic.   Eyes: EOM are normal. No scleral icterus.   Neck: Normal range of motion. No tracheal deviation present.   Cardiovascular: Normal rate, regular rhythm, normal heart sounds and intact distal pulses.    Pulmonary/Chest: Effort normal and breath sounds normal. No respiratory distress.   Abdominal: Soft. Bowel sounds are normal. He exhibits no distension. There is no tenderness.   Musculoskeletal: Normal range of motion. He exhibits no edema.   Neurological: He is alert. He exhibits abnormal muscle tone (L pronator drift).   Skin: Skin is warm and dry. No rash noted. He is not diaphoretic. No erythema.   Psychiatric: He has a normal mood and affect. His behavior is normal.   Vitals reviewed.      Assessment:       1. Essential hypertension    2. Recurrent major depressive disorder, in remission    3. Pure hypercholesterolemia    4. CVD (cerebrovascular disease)    5. Hypersomnolence    6. Myalgia    7. Thrombocytopenia    8. Benzodiazepine dependence    9. Hyperglycemia        Plan:       Herber was seen today for follow-up.    Diagnoses and all orders for this visit:    Essential hypertension   Well-cont    Recurrent major depressive disorder, in remission   Cont rx    Pure hypercholesterolemia  -     Comprehensive metabolic panel; Future  -     Lipid panel; Future    CVD (cerebrovascular disease)   Stable    Hypersomnolence  -     Home Sleep  Studies; Future    Myalgia   Coenzyme Q10    Thrombocytopenia  -     CBC auto differential; Future    Benzodiazepine dependence   Per psych    Hyperglycemia  -     Hemoglobin A1c; Future      Follow-up in about 3 months (around 10/9/2018).

## 2018-07-09 NOTE — TELEPHONE ENCOUNTER
Pt called to say he picked up his CoQ10 per your advise, states will start with one per day, packaging states can take it BID--informed pt per Dr Vizcaino, to take it BID

## 2018-07-09 NOTE — PATIENT INSTRUCTIONS
Co-Enzyme Q10 oral dosage forms  What is this medicine?  CO-ENZYME Q10 (koh EN zahym Q10) is an herbal or dietary supplement. It is promoted to help many disorders including congestive heart failure, certain mitochondrial diseases, migraine, and high blood pressure. The FDA has not approved this supplement for any medical use.  This supplement may be used for other purposes; ask your health care provider or pharmacist if you have questions.  How should I use this medicine?  Take this medicine by mouth with a glass of water. Follow the directions on the package labeling, or take as directed by your health care professional. You should take this medicine with a high-fat meal. Do not take this medicine more often than directed.  Contact your pediatrician regarding the use of this medicine in children. Special care may be needed.  What side effects may I notice from receiving this medicine?  Side effects that you should report to your doctor or health care professional as soon as possible:  · allergic reactions like skin rash, itching or hives, swelling of the face, lips, or tongue  Side effects that usually do not require medical attention (Report these to your doctor or health care professional if they continue or are bothersome.):  · diarrhea  · loss of appetite  · nausea  · trouble sleeping  · upset stomach  What may interact with this medicine?  · medicines for blood pressure  · warfarin  What if I miss a dose?  If you miss a dose, take it as soon as you can. If it is almost time for your next dose, take only that dose. Do not take double or extra doses.  Where should I keep my medicine?  Keep out of the reach of children.  Store at room temperature or as directed on the package label. Throw away any unused medicine after the expiration date.  What should I tell my health care provider before I take this medicine?  They need to know if you have any of these conditions:  · an unusual or allergic reaction to  co-enzyme Q10, other herbs, plants, medicines, foods, dyes, or preservatives  · pregnant or trying to get pregnant  · breast-feeding  What should I watch for while using this medicine?  See your doctor if your symptoms do not get better or if they get worse.  Herbal or dietary supplements are not regulated like medicines. Rigid  standards are not required for dietary supplements. The purity and strength of these products can vary. The safety and effect of this dietary supplement for a certain disease or illness is not well known. This product is not intended to diagnose, treat, cure or prevent any disease.  The Food and Drug Administration suggests the following to help consumers protect themselves:  · Always read product labels and follow directions.  · Natural does not mean a product is safe for humans to take.  · Look for products that include USP after the ingredient name. This means that the  followed the standards of the [place] Pharmacopoeia.  · Supplements made or sold by a nationally known food or drug company are more likely to be made under tight controls. You can write to the company for more information about how the product was made.  NOTE:This sheet is a summary. It may not cover all possible information. If you have questions about this medicine, talk to your doctor, pharmacist, or health care provider. Copyright© 2017 [employer] Standard

## 2018-07-09 NOTE — TELEPHONE ENCOUNTER
----- Message from Irais Brown sent at 7/9/2018  2:35 PM CDT -----  Contact: Self [phone]  Patient is calling to talk to nurse has questions on his medication. Please advice

## 2018-07-11 ENCOUNTER — TELEPHONE (OUTPATIENT)
Dept: INTERNAL MEDICINE | Facility: CLINIC | Age: 83
End: 2018-07-11

## 2018-07-11 NOTE — TELEPHONE ENCOUNTER
Please advise, pt calling for recommendations, c/o headache, also states has been dx'd with sleep apnea

## 2018-07-11 NOTE — TELEPHONE ENCOUNTER
----- Message from Verónica Galaviz sent at 7/11/2018 11:13 AM CDT -----  Contact: self, [phone]  Patient states he has been diagnosed with sleep apnea and he is getting headaches and the pain from the headaches is raising his blood pressure. Please advise.

## 2018-07-11 NOTE — TELEPHONE ENCOUNTER
Gave pt Dr Vizcaino recommendations for tylenol and advised to keep the appt with sleep study, states haven't heard from that dept., will reach out to them for scheduling

## 2018-07-16 ENCOUNTER — TELEPHONE (OUTPATIENT)
Dept: INTERNAL MEDICINE | Facility: CLINIC | Age: 83
End: 2018-07-16

## 2018-07-16 NOTE — TELEPHONE ENCOUNTER
----- Message from Li Jacobsen sent at 7/16/2018  9:33 AM CDT -----  Contact: Self. [phone] or [phone]  Patient would like to speak with you about some questions about his test. Please advise

## 2018-07-17 DIAGNOSIS — I10 ESSENTIAL HYPERTENSION: ICD-10-CM

## 2018-07-17 RX ORDER — AMLODIPINE BESYLATE 10 MG/1
10 TABLET ORAL DAILY
Qty: 90 TABLET | Refills: 3 | Status: SHIPPED | OUTPATIENT
Start: 2018-07-17 | End: 2019-05-30 | Stop reason: SDUPTHER

## 2018-07-17 NOTE — TELEPHONE ENCOUNTER
----- Message from Irais Brown sent at 7/17/2018 10:49 AM CDT -----  Contact: Self [phone]  Patient is calling to get refills on her medication sent to [facility] - [address] [phone] (Phone)  [phone] (Fax)        1. amLODIPine (NORVASC) 10 MG tablet 90 tablet  Generic brand

## 2018-08-07 ENCOUNTER — TELEPHONE (OUTPATIENT)
Dept: SLEEP MEDICINE | Facility: OTHER | Age: 83
End: 2018-08-07

## 2018-08-09 ENCOUNTER — OFFICE VISIT (OUTPATIENT)
Dept: INTERNAL MEDICINE | Facility: CLINIC | Age: 83
End: 2018-08-09
Payer: MEDICARE

## 2018-08-09 ENCOUNTER — TELEPHONE (OUTPATIENT)
Dept: SLEEP MEDICINE | Facility: OTHER | Age: 83
End: 2018-08-09

## 2018-08-09 VITALS
OXYGEN SATURATION: 97 % | DIASTOLIC BLOOD PRESSURE: 60 MMHG | HEIGHT: 68 IN | HEART RATE: 77 BPM | BODY MASS INDEX: 27.06 KG/M2 | TEMPERATURE: 98 F | SYSTOLIC BLOOD PRESSURE: 118 MMHG | WEIGHT: 178.56 LBS

## 2018-08-09 DIAGNOSIS — N41.0 ACUTE PROSTATITIS: Primary | ICD-10-CM

## 2018-08-09 DIAGNOSIS — I10 ESSENTIAL HYPERTENSION: ICD-10-CM

## 2018-08-09 LAB
BILIRUB SERPL-MCNC: ABNORMAL MG/DL
BLOOD URINE, POC: ABNORMAL
COLOR, POC UA: ABNORMAL
GLUCOSE UR QL STRIP: NEGATIVE
KETONES UR QL STRIP: ABNORMAL
LEUKOCYTE ESTERASE URINE, POC: ABNORMAL
NITRITE, POC UA: POSITIVE
PH, POC UA: 5
PROTEIN, POC: 100
SPECIFIC GRAVITY, POC UA: 1.02
UROBILINOGEN, POC UA: 1

## 2018-08-09 PROCEDURE — 99213 OFFICE O/P EST LOW 20 MIN: CPT | Mod: S$PBB,,, | Performed by: INTERNAL MEDICINE

## 2018-08-09 PROCEDURE — 81002 URINALYSIS NONAUTO W/O SCOPE: CPT | Mod: PBBFAC,PN | Performed by: INTERNAL MEDICINE

## 2018-08-09 PROCEDURE — 99213 OFFICE O/P EST LOW 20 MIN: CPT | Mod: PBBFAC,PN | Performed by: INTERNAL MEDICINE

## 2018-08-09 PROCEDURE — 99999 PR PBB SHADOW E&M-EST. PATIENT-LVL III: CPT | Mod: PBBFAC,,, | Performed by: INTERNAL MEDICINE

## 2018-08-09 RX ORDER — SULFAMETHOXAZOLE AND TRIMETHOPRIM 800; 160 MG/1; MG/1
1 TABLET ORAL 2 TIMES DAILY
Qty: 28 TABLET | Refills: 0 | Status: SHIPPED | OUTPATIENT
Start: 2018-08-09 | End: 2018-08-23 | Stop reason: ALTCHOICE

## 2018-08-09 NOTE — PROGRESS NOTES
Subjective:       Patient ID: Herber Peña is a 82 y.o. male.    Chief Complaint: Possible UTI    HPI  Pt with 2 weeks of LBP, chills, urinary urgency and malaise.  No abd pain.  Review of Systems    Objective:      Physical Exam   Constitutional: He appears well-developed. No distress.   HENT:   Head: Normocephalic.   Eyes: EOM are normal. No scleral icterus.   Neck: Normal range of motion. No tracheal deviation present.   Cardiovascular: Normal rate, regular rhythm and intact distal pulses.    Pulmonary/Chest: Effort normal. No respiratory distress.   Abdominal: He exhibits no distension.   Musculoskeletal: Normal range of motion. He exhibits no edema or tenderness.   Neurological: He is alert.   Skin: Skin is warm and dry. No rash noted. He is not diaphoretic. No erythema.   Psychiatric: He has a normal mood and affect. His behavior is normal.   Vitals reviewed.      Assessment:       1. Acute prostatitis    2. Essential hypertension        Plan:       Herber was seen today for possible uti.    Diagnoses and all orders for this visit:    Acute prostatitis  -     POCT urine dipstick without microscope  -     sulfamethoxazole-trimethoprim 800-160mg (BACTRIM DS) 800-160 mg Tab; Take 1 tablet by mouth 2 (two) times daily. for 14 days    Essential hypertension   Well-cont    Follow-up in about 2 weeks (around 8/23/2018).

## 2018-08-23 ENCOUNTER — OFFICE VISIT (OUTPATIENT)
Dept: INTERNAL MEDICINE | Facility: CLINIC | Age: 83
End: 2018-08-23
Payer: MEDICARE

## 2018-08-23 VITALS
OXYGEN SATURATION: 96 % | HEIGHT: 68 IN | WEIGHT: 179.25 LBS | BODY MASS INDEX: 27.17 KG/M2 | SYSTOLIC BLOOD PRESSURE: 110 MMHG | HEART RATE: 68 BPM | DIASTOLIC BLOOD PRESSURE: 60 MMHG

## 2018-08-23 DIAGNOSIS — F33.40 RECURRENT MAJOR DEPRESSIVE DISORDER, IN REMISSION: ICD-10-CM

## 2018-08-23 DIAGNOSIS — I10 ESSENTIAL HYPERTENSION: ICD-10-CM

## 2018-08-23 DIAGNOSIS — N41.0 ACUTE PROSTATITIS: Primary | ICD-10-CM

## 2018-08-23 DIAGNOSIS — G44.209 TENSION HEADACHE: ICD-10-CM

## 2018-08-23 PROCEDURE — 99213 OFFICE O/P EST LOW 20 MIN: CPT | Mod: PBBFAC,PN | Performed by: INTERNAL MEDICINE

## 2018-08-23 PROCEDURE — 99214 OFFICE O/P EST MOD 30 MIN: CPT | Mod: S$PBB,,, | Performed by: INTERNAL MEDICINE

## 2018-08-23 PROCEDURE — 81002 URINALYSIS NONAUTO W/O SCOPE: CPT | Mod: PBBFAC,PN | Performed by: INTERNAL MEDICINE

## 2018-08-23 PROCEDURE — 99999 PR PBB SHADOW E&M-EST. PATIENT-LVL III: CPT | Mod: PBBFAC,,, | Performed by: INTERNAL MEDICINE

## 2018-08-23 RX ORDER — VARDENAFIL HYDROCHLORIDE 20 MG/1
20 TABLET, FILM COATED ORAL DAILY
Refills: 4 | COMMUNITY
Start: 2018-08-06 | End: 2018-08-23

## 2018-08-23 RX ORDER — QUETIAPINE FUMARATE 25 MG/1
TABLET, FILM COATED ORAL
COMMUNITY
Start: 2018-07-27 | End: 2018-08-23

## 2018-08-23 RX ORDER — CLONIDINE 0.1 MG/24H
1 PATCH, EXTENDED RELEASE TRANSDERMAL
Qty: 12 PATCH | Refills: 4 | Status: SHIPPED | OUTPATIENT
Start: 2018-08-23 | End: 2018-11-19 | Stop reason: DRUGHIGH

## 2018-08-23 RX ORDER — CITALOPRAM 20 MG/1
20 TABLET, FILM COATED ORAL DAILY
Refills: 0 | COMMUNITY
Start: 2018-06-05 | End: 2018-10-09

## 2018-08-23 NOTE — PROGRESS NOTES
Subjective:       Patient ID: Herber Peña is a 82 y.o. male.    Chief Complaint: Follow-up    HPI  Recheck for prostatitis.  No abd pain, dysuria, F/C.  Nocturia x 1.  C/O frontal and retroorbital HA.  Has had a normal eye exam.  No CP, SOB.  Review of Systems   All other systems reviewed and are negative.      Objective:      Physical Exam   Constitutional: He appears well-developed. No distress.   HENT:   Head: Normocephalic.   Eyes: EOM are normal. No scleral icterus.   Neck: Normal range of motion. No tracheal deviation present.   Cardiovascular: Normal rate, regular rhythm, normal heart sounds and intact distal pulses.   Pulmonary/Chest: Effort normal and breath sounds normal. No respiratory distress.   Abdominal: Soft. Bowel sounds are normal. He exhibits no distension. There is no tenderness.   Musculoskeletal: Normal range of motion. He exhibits no edema.   Neurological: He is alert.   Skin: Skin is warm and dry. No rash noted. He is not diaphoretic. No erythema.   Psychiatric: He has a normal mood and affect. His behavior is normal.   Vitals reviewed.      Assessment:       1. Acute prostatitis    2. Essential hypertension    3. Recurrent major depressive disorder, in remission    4. Tension headache        Plan:       Herber was seen today for follow-up.    Diagnoses and all orders for this visit:    Acute prostatitis  -     POCT urine dipstick without microscope   Resolved    Essential hypertension  -     cloNIDine 0.1 mg/24 hr td ptwk (CATAPRES) 0.1 mg/24 hr; Place 1 patch onto the skin every 7 days.   Cont other rx    Recurrent major depressive disorder, in remission   Cont rx    Tension headache   ACTM    Follow-up in about 1 month (around 9/23/2018).

## 2018-08-24 LAB
BILIRUB SERPL-MCNC: NEGATIVE MG/DL
BLOOD URINE, POC: NEGATIVE
COLOR, POC UA: YELLOW
GLUCOSE UR QL STRIP: NEGATIVE
KETONES UR QL STRIP: NEGATIVE
LEUKOCYTE ESTERASE URINE, POC: NEGATIVE
NITRITE, POC UA: NEGATIVE
PH, POC UA: 5
PROTEIN, POC: NEGATIVE
SPECIFIC GRAVITY, POC UA: 1.03
UROBILINOGEN, POC UA: 0.2

## 2018-08-27 ENCOUNTER — TELEPHONE (OUTPATIENT)
Dept: SLEEP MEDICINE | Facility: OTHER | Age: 83
End: 2018-08-27

## 2018-08-28 ENCOUNTER — TELEPHONE (OUTPATIENT)
Dept: SLEEP MEDICINE | Facility: OTHER | Age: 83
End: 2018-08-28

## 2018-08-30 ENCOUNTER — TELEPHONE (OUTPATIENT)
Dept: INTERNAL MEDICINE | Facility: CLINIC | Age: 83
End: 2018-08-30

## 2018-08-30 ENCOUNTER — TELEPHONE (OUTPATIENT)
Dept: SLEEP MEDICINE | Facility: OTHER | Age: 83
End: 2018-08-30

## 2018-08-30 DIAGNOSIS — G47.10 HYPERSOMNOLENCE: Primary | ICD-10-CM

## 2018-08-30 NOTE — TELEPHONE ENCOUNTER
Talked to patients wife,no sleep time on his home sleep study device.  I will call to reschedule him once Dr. Vizcaino put in a new home sleep study order.

## 2018-08-30 NOTE — TELEPHONE ENCOUNTER
----- Message from Asif Rankin sent at 8/30/2018  9:39 AM CDT -----  Regarding: home sleep study   Dr. Vizcaino               Patient brought back the device and it shows no recorded sleep time.  Can you put in a new home sleep study order.  I'm not able to use the old one,once I checked patient in.    Thank You,  Asif

## 2018-09-21 ENCOUNTER — TELEPHONE (OUTPATIENT)
Dept: SLEEP MEDICINE | Facility: OTHER | Age: 83
End: 2018-09-21

## 2018-09-24 ENCOUNTER — TELEPHONE (OUTPATIENT)
Dept: SLEEP MEDICINE | Facility: OTHER | Age: 83
End: 2018-09-24

## 2018-09-25 ENCOUNTER — HOSPITAL ENCOUNTER (OUTPATIENT)
Dept: SLEEP MEDICINE | Facility: OTHER | Age: 83
Discharge: HOME | End: 2018-09-25
Attending: INTERNAL MEDICINE
Payer: MEDICARE

## 2018-09-25 DIAGNOSIS — G47.10 HYPERSOMNOLENCE: ICD-10-CM

## 2018-09-25 DIAGNOSIS — G47.33 OSA (OBSTRUCTIVE SLEEP APNEA): ICD-10-CM

## 2018-09-25 PROCEDURE — 95806 SLEEP STUDY UNATT&RESP EFFT: CPT | Mod: 26,,, | Performed by: PSYCHIATRY & NEUROLOGY

## 2018-09-25 PROCEDURE — 95800 SLP STDY UNATTENDED: CPT

## 2018-10-02 ENCOUNTER — LAB VISIT (OUTPATIENT)
Dept: LAB | Facility: HOSPITAL | Age: 83
End: 2018-10-02
Attending: INTERNAL MEDICINE
Payer: MEDICARE

## 2018-10-02 DIAGNOSIS — E78.00 PURE HYPERCHOLESTEROLEMIA: ICD-10-CM

## 2018-10-02 DIAGNOSIS — R73.9 HYPERGLYCEMIA: ICD-10-CM

## 2018-10-02 DIAGNOSIS — D69.6 THROMBOCYTOPENIA: ICD-10-CM

## 2018-10-02 LAB
ALBUMIN SERPL BCP-MCNC: 3.8 G/DL
ALP SERPL-CCNC: 72 U/L
ALT SERPL W/O P-5'-P-CCNC: 15 U/L
ANION GAP SERPL CALC-SCNC: 8 MMOL/L
AST SERPL-CCNC: 22 U/L
BASOPHILS # BLD AUTO: 0.03 K/UL
BASOPHILS NFR BLD: 0.5 %
BILIRUB SERPL-MCNC: 0.3 MG/DL
BUN SERPL-MCNC: 21 MG/DL
CALCIUM SERPL-MCNC: 9.2 MG/DL
CHLORIDE SERPL-SCNC: 103 MMOL/L
CHOLEST SERPL-MCNC: 104 MG/DL
CHOLEST/HDLC SERPL: 3.5 {RATIO}
CO2 SERPL-SCNC: 30 MMOL/L
CREAT SERPL-MCNC: 1.03 MG/DL
DIFFERENTIAL METHOD: ABNORMAL
EOSINOPHIL # BLD AUTO: 0.2 K/UL
EOSINOPHIL NFR BLD: 3.8 %
ERYTHROCYTE [DISTWIDTH] IN BLOOD BY AUTOMATED COUNT: 13 %
EST. GFR  (AFRICAN AMERICAN): >60 ML/MIN/1.73 M^2
EST. GFR  (NON AFRICAN AMERICAN): >60 ML/MIN/1.73 M^2
ESTIMATED AVG GLUCOSE: 103 MG/DL
GLUCOSE SERPL-MCNC: 115 MG/DL
HBA1C MFR BLD HPLC: 5.2 %
HCT VFR BLD AUTO: 41.3 %
HDLC SERPL-MCNC: 30 MG/DL
HDLC SERPL: 28.8 %
HGB BLD-MCNC: 13.7 G/DL
LDLC SERPL CALC-MCNC: 47.6 MG/DL
LYMPHOCYTES # BLD AUTO: 1.4 K/UL
LYMPHOCYTES NFR BLD: 22.3 %
MCH RBC QN AUTO: 30.3 PG
MCHC RBC AUTO-ENTMCNC: 33.2 G/DL
MCV RBC AUTO: 91 FL
MONOCYTES # BLD AUTO: 0.6 K/UL
MONOCYTES NFR BLD: 8.6 %
NEUTROPHILS # BLD AUTO: 4.1 K/UL
NEUTROPHILS NFR BLD: 64.5 %
NONHDLC SERPL-MCNC: 74 MG/DL
PLATELET # BLD AUTO: 132 K/UL
PMV BLD AUTO: 10.6 FL
POTASSIUM SERPL-SCNC: 3.6 MMOL/L
PROT SERPL-MCNC: 6.4 G/DL
RBC # BLD AUTO: 4.52 M/UL
SODIUM SERPL-SCNC: 141 MMOL/L
TRIGL SERPL-MCNC: 132 MG/DL
WBC # BLD AUTO: 6.38 K/UL

## 2018-10-02 PROCEDURE — 80061 LIPID PANEL: CPT

## 2018-10-02 PROCEDURE — 80053 COMPREHEN METABOLIC PANEL: CPT | Mod: PO

## 2018-10-02 PROCEDURE — 83036 HEMOGLOBIN GLYCOSYLATED A1C: CPT

## 2018-10-02 PROCEDURE — 36415 COLL VENOUS BLD VENIPUNCTURE: CPT | Mod: PO

## 2018-10-02 PROCEDURE — 85025 COMPLETE CBC W/AUTO DIFF WBC: CPT | Mod: PO

## 2018-10-05 ENCOUNTER — TELEPHONE (OUTPATIENT)
Dept: INTERNAL MEDICINE | Facility: CLINIC | Age: 83
End: 2018-10-05

## 2018-10-05 DIAGNOSIS — G47.33 OSA (OBSTRUCTIVE SLEEP APNEA): Primary | ICD-10-CM

## 2018-10-09 ENCOUNTER — OFFICE VISIT (OUTPATIENT)
Dept: INTERNAL MEDICINE | Facility: CLINIC | Age: 83
End: 2018-10-09
Payer: MEDICARE

## 2018-10-09 VITALS
HEART RATE: 72 BPM | BODY MASS INDEX: 27.17 KG/M2 | SYSTOLIC BLOOD PRESSURE: 118 MMHG | WEIGHT: 179.25 LBS | DIASTOLIC BLOOD PRESSURE: 60 MMHG | HEIGHT: 68 IN

## 2018-10-09 DIAGNOSIS — G44.209 TENSION HEADACHE: ICD-10-CM

## 2018-10-09 DIAGNOSIS — E78.00 PURE HYPERCHOLESTEROLEMIA: ICD-10-CM

## 2018-10-09 DIAGNOSIS — I67.9 CVD (CEREBROVASCULAR DISEASE): ICD-10-CM

## 2018-10-09 DIAGNOSIS — G44.89 CHRONIC MIXED HEADACHE SYNDROME: ICD-10-CM

## 2018-10-09 DIAGNOSIS — I10 ESSENTIAL HYPERTENSION: ICD-10-CM

## 2018-10-09 DIAGNOSIS — G47.33 OSA (OBSTRUCTIVE SLEEP APNEA): ICD-10-CM

## 2018-10-09 DIAGNOSIS — Z00.00 ROUTINE GENERAL MEDICAL EXAMINATION AT A HEALTH CARE FACILITY: Primary | ICD-10-CM

## 2018-10-09 PROBLEM — N18.30 CKD (CHRONIC KIDNEY DISEASE), STAGE III: Status: RESOLVED | Noted: 2017-11-07 | Resolved: 2018-10-09

## 2018-10-09 PROCEDURE — 99397 PER PM REEVAL EST PAT 65+ YR: CPT | Mod: S$PBB,,, | Performed by: INTERNAL MEDICINE

## 2018-10-09 PROCEDURE — 99213 OFFICE O/P EST LOW 20 MIN: CPT | Mod: PBBFAC,PN,25 | Performed by: INTERNAL MEDICINE

## 2018-10-09 PROCEDURE — 90662 IIV NO PRSV INCREASED AG IM: CPT | Mod: PBBFAC,PN

## 2018-10-09 PROCEDURE — 99999 PR PBB SHADOW E&M-EST. PATIENT-LVL III: CPT | Mod: PBBFAC,,, | Performed by: INTERNAL MEDICINE

## 2018-10-09 NOTE — PROGRESS NOTES
Subjective:       Patient ID: Herber Peña is a 82 y.o. male.    Chief Complaint: Follow-up    [facility]  Wellness check.  Labs reviewed.  C/O worsening retroorbital and bitemporal HAs in afternoons, SBPs up into 140s.  No visual problems.  Having anxiety in afternoons.  No CP, SOB, weakness.  No current urinary problems.  Gives a hx of concussions in youth from football and MV[employer]  Review of Systems   All other systems reviewed and are negative.      Objective:      Physical Exam   Constitutional: He appears well-developed. No distress.   HENT:   Head: Normocephalic.   Eyes: EOM are normal. No scleral icterus.   Neck: Normal range of motion. No tracheal deviation present.   Cardiovascular: Normal rate, regular rhythm, normal heart sounds and intact distal pulses.   Pulmonary/Chest: Effort normal and breath sounds normal. No respiratory distress.   Abdominal: Soft. Bowel sounds are normal. He exhibits no distension.   Musculoskeletal: Normal range of motion. He exhibits no edema.   Neurological: He is alert. He exhibits abnormal muscle tone (L pronator drift).   MMSE 30/30   Skin: Skin is warm and dry. No rash noted. He is not diaphoretic. No erythema.   Psychiatric: He has a normal mood and affect. His behavior is normal.   Vitals reviewed.      Assessment:       1. Routine general medical examination at a health care facility    2. ADY (obstructive sleep apnea)    3. Essential hypertension    4. Tension headache    5. Pure hypercholesterolemia    6. CVD (cerebrovascular disease)    7. Chronic mixed headache syndrome        Plan:       Herber was seen today for follow-up.    Diagnoses and all orders for this visit:    Routine general medical examination at a health care facility    ADY (obstructive sleep apnea)   Cont CPAP    Essential hypertension   Well-cont    Tension headache   Cont rx    Pure hypercholesterolemia   Well-cont    CVD (cerebrovascular disease)   MRI of brain w/o contrast    Other orders  -      Influenza - High Dose (65+) (PF) (IM)      Follow-up in about 6 months (around 4/9/2019).

## 2018-10-12 ENCOUNTER — HOSPITAL ENCOUNTER (OUTPATIENT)
Dept: RADIOLOGY | Facility: HOSPITAL | Age: 83
Discharge: HOME | End: 2018-10-12
Attending: INTERNAL MEDICINE
Payer: MEDICARE

## 2018-10-12 DIAGNOSIS — G44.89 CHRONIC MIXED HEADACHE SYNDROME: ICD-10-CM

## 2018-10-12 PROCEDURE — 70551 MRI BRAIN STEM W/O DYE: CPT | Mod: TC,PO

## 2018-11-02 ENCOUNTER — TELEPHONE (OUTPATIENT)
Dept: INTERNAL MEDICINE | Facility: CLINIC | Age: 83
End: 2018-11-02

## 2018-11-02 NOTE — TELEPHONE ENCOUNTER
----- Message from Jose Woodruff sent at 11/2/2018 10:31 AM CDT -----  Contact: xoag/[phone] or [phone]  Patient called to check the status of his sleep mask order from 3 months ago.    Please call and advise as soon as possible.

## 2018-11-02 NOTE — TELEPHONE ENCOUNTER
Spoke with patient and he reports he has not received his CPAP machine. [facility] and instructed to put a rush on order.

## 2018-11-06 DIAGNOSIS — G47.30 SLEEP APNEA, UNSPECIFIED TYPE: Primary | ICD-10-CM

## 2018-11-26 ENCOUNTER — OFFICE VISIT (OUTPATIENT)
Dept: SLEEP MEDICINE | Facility: CLINIC | Age: 83
End: 2018-11-26
Payer: MEDICARE

## 2018-11-26 VITALS
DIASTOLIC BLOOD PRESSURE: 67 MMHG | HEART RATE: 61 BPM | SYSTOLIC BLOOD PRESSURE: 130 MMHG | BODY MASS INDEX: 27.3 KG/M2 | HEIGHT: 68 IN | WEIGHT: 180.13 LBS

## 2018-11-26 DIAGNOSIS — G47.33 OBSTRUCTIVE SLEEP APNEA: Primary | ICD-10-CM

## 2018-11-26 PROCEDURE — 99999 PR PBB SHADOW E&M-EST. PATIENT-LVL IV: CPT | Mod: PBBFAC,,, | Performed by: NURSE PRACTITIONER

## 2018-11-26 PROCEDURE — 99203 OFFICE O/P NEW LOW 30 MIN: CPT | Mod: S$PBB,,, | Performed by: NURSE PRACTITIONER

## 2018-11-26 PROCEDURE — 99214 OFFICE O/P EST MOD 30 MIN: CPT | Mod: PBBFAC | Performed by: NURSE PRACTITIONER

## 2018-11-26 NOTE — LETTER
November 26, 2018      Markus Vizcaino MD  [address] [facility]  [address]  [facility] [address]           [facility]  [address]  [facility] [address]  Phone: [phone]          Patient: Herber Peña   MR Number: [id number]   YOB: 1935   Date of Visit: 11/26/2018       Dear Dr. Markus Vizcaino:    Thank you for referring Herber Peña to me for evaluation. Attached you will find relevant portions of my assessment and plan of care.    If you have questions, please do not hesitate to call me. I look forward to following Herber Peña along with you.    Sincerely,    Cortney Oh, KRISTOPHER    Enclosure  CC:  No Recipients    If you would like to receive this communication electronically, please contact [email or web address] or [phone] to request more information on [employer] Link access.    For providers and/or their staff who would like to refer a patient to [facility], please contact us through our one-stop-shop provider referral line, [facility], at [phone].    If you feel you have received this communication in error or would no longer like to receive these types of communications, please e-mail [email or web address]

## 2018-11-26 NOTE — PROGRESS NOTES
Herber Peña  was seen as a new patient at the request of  Markus Vizcaino MD for the evaluation of obstructive sleep apnea.    CHIEF COMPLAINT:    Chief Complaint   Patient presents with    Sleep Apnea       11/26/2018 APOLINAR Oh NP: Initial HISTORY OF PRESENT ILLNESS: Herber Peña is a 82 y.o. male is here for sleep evaluation.       Pt main compliant today is anxiety and depression.   Pt feels anxious and unrefreshing sleep  Reports that he takes Xanax TID as directed by psych.   Concerned mood disorder from underlying ADY  Pt completed HST 09/25/2018 ordered by PCP.   Discussed HST results in detail today  Was advised by PCP that someone will contact him regarding CPAP machine, but never heard about CPAP set up  Pt would like to try CPAP, DME closest to home in [place] and prefers [facility] for subsequent follow-ups  Denies symptoms of restless legs or kicking during sleep.    Occupation: retired     [place] Sleepiness Scale score during initial sleep evaluation was 9.     SLEEP ROUTINE:    Time to bed:  10 pm  Sleep onset latency:  Up to an hour  Disruptions or awakenings:   1-2 bathroom  Wakeup time:    7 am  Perceived sleep quality: fair        09/25/2018   lb. The overall AHI was 5 and overall RDI was 8. The oxygen jocy was 85.5% and % time < 90% SpO2 was 0.5%.      PAST MEDICAL HISTORY:    Active Ambulatory Problems     Diagnosis Date Noted    Essential hypertension 10/04/2012    Hyperlipidemia 10/04/2012    Diastolic dysfunction 09/20/2016    CVD (cerebrovascular disease) 11/07/2017    Microalbuminuria 05/23/2018    Recurrent major depressive disorder, in remission 07/09/2018    Pure hypercholesterolemia 07/09/2018    Hypersomnolence 07/09/2018    Myalgia 07/09/2018    Thrombocytopenia 07/09/2018    Benzodiazepine dependence 07/09/2018    Hyperglycemia 07/09/2018    Tension headache 08/23/2018    ADY (obstructive sleep apnea)      Resolved Ambulatory Problems     Diagnosis  Date Noted    Cerebral infarction 10/04/2012    Prostate cancer screening 03/07/2014    Depression 03/09/2015    CKD (chronic kidney disease), stage III 11/07/2017    Iron deficiency anemia secondary to inadequate dietary iron intake 05/23/2018    Anemia of chronic renal failure, stage 3 (moderate) 05/23/2018     Past Medical History:   Diagnosis Date    Depression     Hyperlipidemia     Hypertension     Stroke                 PAST SURGICAL HISTORY:    No past surgical history on file.      FAMILY HISTORY:                Family History   Problem Relation Age of Onset    Heart attack Father        SOCIAL HISTORY:          Tobacco:   Social History     Tobacco Use   Smoking Status Former Smoker   Smokeless Tobacco Never Used       Alcohol use:    Social History     Substance and Sexual Activity   Alcohol Use No                 ALLERGIES:    Review of patient's allergies indicates:   Allergen Reactions    Codeine     Pholcodine        CURRENT MEDICATIONS:    Current Outpatient Medications   Medication Sig Dispense Refill    ALPRAZolam (XANAX) 1 MG tablet 1 mg 3 (three) times daily as needed.       amLODIPine (NORVASC) 10 MG tablet Take 1 tablet (10 mg total) by mouth once daily. 90 tablet 3    cloNIDine 0.2 mg/24 hr td ptwk (CATAPRES) 0.2 mg/24 hr Place 1 patch onto the skin every 7 days.      clopidogrel (PLAVIX) 75 mg tablet Take 1 tablet (75 mg total) by mouth once daily. 90 tablet 3    hydroCHLOROthiazide (HYDRODIURIL) 25 MG tablet Take 1 tablet (25 mg total) by mouth once daily. 90 tablet 3    losartan (COZAAR) 100 MG tablet Take 100 mg by mouth once daily.      multivit-mins/iron/folic/lycop (CENTRUM MEN ORAL) Take 1 tablet by mouth once daily.      pantoprazole (PROTONIX) 40 MG tablet Take 40 mg by mouth once daily.       rosuvastatin (CRESTOR) 10 MG tablet TAKE 1 TABLET DAILY 90 tablet 3    saw palmetto fruit 450 mg Cap Take by mouth every evening.      trazodone (DESYREL) 100 MG tablet  "Take 300 mg by mouth nightly as needed.        No current facility-administered medications for this visit.                   REVIEW OF SYSTEMS:     Sleep related symptoms as per HPI.  CONST:Denies weight gain    HEENT: Denies sinus congestion  PULM: Denies dyspnea  CARD:  Denies palpitations   GI:  Denies acid reflux  : Denies polyuria  NEURO: Denies headaches  PSYCH: Reports mood disturbance  HEME: Denies anemia    Otherwise, a balance of 10 systems reviewed is negative.          PHYSICAL EXAM:  Vitals:    11/26/18 1330   BP: 130/67   Pulse: 61   Weight: 81.7 kg (180 lb 1.9 oz)   Height: 5' 8" (1.727 m)   PainSc: 0-No pain     Body mass index is 27.39 kg/m².     GENERAL: Normal development, well groomed  NECK: Supple. No thyromegaly. No palpable nodes.    SKIN: On face and neck: No abrasions, no rashes, no lesions.  No subcutaneous nodules are palpable.  RESPIRATORY: Normal chest expansion and non-labored breathing at rest.  CARDIOVASCULAR: Normal rate  EXTREMITIES: No edema. No clubbing. No cyanosis. Station normal. Gait normal.        NEURO/PSYCH: Oriented to time, place and person. Normal attention span and concentration. Affect is full. Mood is normal.                                              ASSESSMENT:    Obstructive sleep apnea, mild by AHI/RDI. The patient symptomatically has anxiety in AM and unrefreshing sleep.Medical co-morbidities: anxiety/depression, systemic HTN, HLD, and overweight BMI.  This warrants further investigation for possible obstructive sleep apnea.      PLAN:    Education: During our discussion today, we talked about the etiology of obstructive sleep apnea as well as the potential ramifications of untreated sleep apnea, which could include daytime sleepiness, hypertension, heart disease and/or stroke. We discussed potential treatment options, which could include weight loss, body positioning, continuous positive airway pressure (CPAP), OA, EPAP, or referral for surgical " consideration.     Treatment:  -trial APAP 6 - 20 cm @ [facility] closest to home. RTC 31 - 90 days after PAP . Pt to make f/u appt approximately 5 weeks from set up date either via [facility] or by contacting Sleep Clinic; advised to request [facility] per pt preference. Pt's son Flaco going to [facility] today to set up patient portal account on behalf of pt for future correspondence/scheduling  -If patient has difficulty with CPAP adherence or ongoing ADY symptoms despite CPAP adherence, then consider an in-lab titration sleep study in order to determine optimal fixed CPAP setting.    Precautions: The patient was advised to abstain from driving should they feel sleepy  or drowsy.     Thank you for allowing me the opportunity to participate in the care of your patient.

## 2018-11-30 ENCOUNTER — OFFICE VISIT (OUTPATIENT)
Dept: INTERNAL MEDICINE | Facility: CLINIC | Age: 83
End: 2018-11-30
Payer: MEDICARE

## 2018-11-30 VITALS
SYSTOLIC BLOOD PRESSURE: 148 MMHG | DIASTOLIC BLOOD PRESSURE: 62 MMHG | HEIGHT: 68 IN | HEART RATE: 60 BPM | WEIGHT: 180.44 LBS | BODY MASS INDEX: 27.35 KG/M2

## 2018-11-30 DIAGNOSIS — G47.33 OSA (OBSTRUCTIVE SLEEP APNEA): ICD-10-CM

## 2018-11-30 DIAGNOSIS — I10 HTN (HYPERTENSION), BENIGN: ICD-10-CM

## 2018-11-30 DIAGNOSIS — G47.00 INSOMNIA, UNSPECIFIED TYPE: Primary | ICD-10-CM

## 2018-11-30 PROCEDURE — 99999 PR PBB SHADOW E&M-EST. PATIENT-LVL III: CPT | Mod: PBBFAC,,, | Performed by: INTERNAL MEDICINE

## 2018-11-30 PROCEDURE — 99213 OFFICE O/P EST LOW 20 MIN: CPT | Mod: S$PBB,,, | Performed by: INTERNAL MEDICINE

## 2018-11-30 PROCEDURE — 99213 OFFICE O/P EST LOW 20 MIN: CPT | Mod: PBBFAC,PN | Performed by: INTERNAL MEDICINE

## 2018-11-30 NOTE — PROGRESS NOTES
Subjective:       Patient ID: Herber Peña is a 82 y.o. male.    Chief Complaint: Follow-up (for sleep problems)    HPI  Pt still c/o insomnia despite Trazodone 300 mg qhs and Xanax 1 mg tid.  Demies having lights, TV or radio on in bedroom qhs.  No cellphone use qhs.  Hasn't gotten his CPAP yet.  Review of Systems    Objective:      Physical Exam   Constitutional: He appears well-developed. No distress.   HENT:   Head: Normocephalic.   Eyes: EOM are normal. No scleral icterus.   Neck: Normal range of motion. No tracheal deviation present.   Cardiovascular: Normal rate, regular rhythm and intact distal pulses.   Pulmonary/Chest: Effort normal. No respiratory distress.   Abdominal: He exhibits no distension.   Musculoskeletal: Normal range of motion. He exhibits no edema.   Neurological: He is alert.   Skin: Skin is warm and dry. No rash noted. He is not diaphoretic. No erythema.   Psychiatric: He has a normal mood and affect. His behavior is normal.   Vitals reviewed.      Assessment:       1. Insomnia, unspecified type    2. ADY (obstructive sleep apnea)    3. HTN (hypertension), benign        Plan:       Herber was seen today for follow-up.    Diagnoses and all orders for this visit:    Insomnia, unspecified type   Gave sleep hygiene handout    ADY (obstructive sleep apnea)  -     CPAP FOR HOME USE    HTN (hypertension), benign   Monitor      No Follow-up on file.

## 2018-11-30 NOTE — PATIENT INSTRUCTIONS
Treating Insomnia     Learning to relax before bedtime can improve your sleep.     Good sleeping habits are a key part of treatment. If needed, some medications may help you sleep better at first. Making healthy lifestyle changes and learning to relax can improve your sleep. Treating insomnia takes commitment, but trust that your efforts will pay off. Talk to your health care provider before taking any medication.  Healthy lifestyle  Your lifestyle affects your health and your sleep. Here are some healthy habits:  · Keep a regular sleep schedule. Go to bed and get up at the same time each day.  · Exercise regularly. It may help you reduce stress. Avoid strenuous exercise for 2 to 4 hours before bedtime.  · Avoid or limit naps, especially in the late afternoon.  · Use your bed only for sleep and sex.  · Dont spend too much time in bed trying to fall asleep. If you cant fall asleep, get up and do something (no electronics) until you become tired and drowsy.  · Avoid or limit caffeine and nicotine for up to 6 hours before bedtime. They can keep you awake at night.   · Also avoid alcohol for at least 4 to 6 hours before bedtime. It may help you fall asleep at first, but you will have more awakenings throughout the night, and your sleep will not be restful.  Before bedtime  To sleep better every night, try these tips:  · Have a bedtime routine to let your body and mind know when its time to sleep.  · Think of going to bed as relaxing and enjoyable. Sleep will come sooner.  · If your worries dont let you sleep, write them down in a diary. Then close it, and go to bed.  · Make sure the room is not too hot or too cold. If its not dark enough, an eye mask can help. If its noisy, try using earplugs.  Learn to relax  Stress, anxiety, and body tension may keep you awake at night. To unwind before bedtime, try a warm bath, meditation, or yoga. Also, try the following:  · Deep breathing. Sit or lie back in a chair. Take a  slow, deep breath. Hold it for 5 counts. Then breathe out slowly through your mouth. Keep doing this until you feel relaxed.  · Progressive muscle relaxation. Tense and then relax the muscles in your body as you breathe deeply. Start with your feet and work up your body to your neck and face.  Date Last Reviewed: 7/18/2015  © 8286-6558 [employer]. [address] [place] [address], [place] [id number]. All rights reserved. This information is not intended as a substitute for professional medical care. Always follow your healthcare professional's instructions.

## 2018-12-02 DIAGNOSIS — R73.9 HYPERGLYCEMIA: ICD-10-CM

## 2018-12-02 DIAGNOSIS — I63.89 CEREBRAL INFARCTION DUE TO OTHER MECHANISM: ICD-10-CM

## 2018-12-02 DIAGNOSIS — E78.5 HYPERLIPIDEMIA, UNSPECIFIED HYPERLIPIDEMIA TYPE: ICD-10-CM

## 2018-12-02 DIAGNOSIS — I10 ESSENTIAL HYPERTENSION: ICD-10-CM

## 2018-12-02 PROBLEM — Q61.02 MULTIPLE RENAL CYSTS: Status: ACTIVE | Noted: 2018-12-02

## 2018-12-03 RX ORDER — ROSUVASTATIN CALCIUM 10 MG/1
TABLET, COATED ORAL
Qty: 90 TABLET | Refills: 3 | Status: SHIPPED | OUTPATIENT
Start: 2018-12-03 | End: 2021-11-17

## 2018-12-04 RX ORDER — CLOPIDOGREL BISULFATE 75 MG/1
TABLET ORAL
Qty: 90 TABLET | Refills: 3 | OUTPATIENT
Start: 2018-12-04

## 2018-12-11 ENCOUNTER — PATIENT MESSAGE (OUTPATIENT)
Dept: SLEEP MEDICINE | Facility: CLINIC | Age: 83
End: 2018-12-11

## 2018-12-18 ENCOUNTER — TELEPHONE (OUTPATIENT)
Dept: INTERNAL MEDICINE | Facility: CLINIC | Age: 83
End: 2018-12-18

## 2018-12-18 DIAGNOSIS — G47.33 OSA (OBSTRUCTIVE SLEEP APNEA): Primary | ICD-10-CM

## 2018-12-18 NOTE — TELEPHONE ENCOUNTER
----- Message from Li Jacobsen sent at 12/18/2018  3:21 PM CST -----  Contact: Self [phone]  Patient would like to speak with you about getting his sleep apnea test results and he has been feeling very cold all the time and he would like a call back today. Please advise    CPAP ordered

## 2019-01-08 ENCOUNTER — TELEPHONE (OUTPATIENT)
Dept: SLEEP MEDICINE | Facility: CLINIC | Age: 84
End: 2019-01-08

## 2019-01-08 NOTE — TELEPHONE ENCOUNTER
Called pt to Update him on the progress with his cpap machine. I called [facility], the lady I spoke with (Didn't get her name) wasn't sure what is needed to colplete the process but someone is suppose to call me back to inform me on what else is needed.

## 2019-01-08 NOTE — TELEPHONE ENCOUNTER
----- Message from Rahel Sigala sent at 1/8/2019 11:30 AM CST -----  Contact: pt   Name of Who is Calling: NILSON SEAY [[id number]]    What is the request in detail: Patient is requesting to speak with staff in regards to CPAP equipment.....Please contact to further discuss and advise      Can the clinic reply by [facility]: No     What Number [facility]: [phone]

## 2019-01-18 ENCOUNTER — TELEPHONE (OUTPATIENT)
Dept: INTERNAL MEDICINE | Facility: CLINIC | Age: 84
End: 2019-01-18

## 2019-01-18 NOTE — TELEPHONE ENCOUNTER
Called Rufina flores RN. Not sure what she was calling about but she was unavailable.She will call back after clinic.

## 2019-01-18 NOTE — TELEPHONE ENCOUNTER
----- Message from Erickson Harper MA sent at 1/17/2019  3:23 PM CST -----  Contact: Rufina BOOTHE from Cardiology in [place]/Dr Roger Jay  [phone]  Rufina is requesting to speak with you regarding some an order you submitted in [place]empt[place] to [facility], on behalf of the above pt for a CPAP machine, please call and advice    Thanks

## 2019-02-11 RX ORDER — HYDROCHLOROTHIAZIDE 25 MG/1
TABLET ORAL
Qty: 90 TABLET | Refills: 4 | Status: SHIPPED | OUTPATIENT
Start: 2019-02-11 | End: 2020-05-06 | Stop reason: SDUPTHER

## 2019-04-10 ENCOUNTER — OFFICE VISIT (OUTPATIENT)
Dept: INTERNAL MEDICINE | Facility: CLINIC | Age: 84
End: 2019-04-10
Payer: MEDICARE

## 2019-04-10 VITALS
HEIGHT: 68 IN | OXYGEN SATURATION: 94 % | HEART RATE: 71 BPM | WEIGHT: 185.19 LBS | SYSTOLIC BLOOD PRESSURE: 132 MMHG | DIASTOLIC BLOOD PRESSURE: 60 MMHG | BODY MASS INDEX: 28.07 KG/M2

## 2019-04-10 DIAGNOSIS — D69.6 THROMBOCYTOPENIA: ICD-10-CM

## 2019-04-10 DIAGNOSIS — I67.9 CVD (CEREBROVASCULAR DISEASE): ICD-10-CM

## 2019-04-10 DIAGNOSIS — Z00.00 ROUTINE GENERAL MEDICAL EXAMINATION AT A HEALTH CARE FACILITY: Primary | ICD-10-CM

## 2019-04-10 DIAGNOSIS — I10 ESSENTIAL HYPERTENSION: ICD-10-CM

## 2019-04-10 DIAGNOSIS — F13.20 BENZODIAZEPINE DEPENDENCE: ICD-10-CM

## 2019-04-10 DIAGNOSIS — F33.40 RECURRENT MAJOR DEPRESSIVE DISORDER, IN REMISSION: ICD-10-CM

## 2019-04-10 PROBLEM — N18.30 ANEMIA OF CHRONIC RENAL FAILURE, STAGE 3 (MODERATE): Status: RESOLVED | Noted: 2018-05-23 | Resolved: 2019-04-10

## 2019-04-10 PROBLEM — D63.1 ANEMIA OF CHRONIC RENAL FAILURE, STAGE 3 (MODERATE): Status: RESOLVED | Noted: 2018-05-23 | Resolved: 2019-04-10

## 2019-04-10 PROCEDURE — 99397 PER PM REEVAL EST PAT 65+ YR: CPT | Mod: S$PBB,,, | Performed by: INTERNAL MEDICINE

## 2019-04-10 PROCEDURE — 99397 PR PREVENTIVE VISIT,EST,65 & OVER: ICD-10-PCS | Mod: S$PBB,,, | Performed by: INTERNAL MEDICINE

## 2019-04-10 PROCEDURE — 99999 PR PBB SHADOW E&M-EST. PATIENT-LVL III: CPT | Mod: PBBFAC,,, | Performed by: INTERNAL MEDICINE

## 2019-04-10 PROCEDURE — 99213 OFFICE O/P EST LOW 20 MIN: CPT | Mod: PBBFAC,PN | Performed by: INTERNAL MEDICINE

## 2019-04-10 PROCEDURE — 99999 PR PBB SHADOW E&M-EST. PATIENT-LVL III: ICD-10-PCS | Mod: PBBFAC,,, | Performed by: INTERNAL MEDICINE

## 2019-04-10 PROCEDURE — G0009 ADMIN PNEUMOCOCCAL VACCINE: HCPCS | Mod: PBBFAC,PN

## 2019-04-10 NOTE — PROGRESS NOTES
Subjective:       Patient ID: Herber Peña is a 83 y.o. male.    Chief Complaint: Follow-up    [facility]  Wellness check.  Chart reviewed.  Less depressed.  No CP, SOB.  Has chronic tinnitus L ear, some balance problems.  C/O cold intolerance.  Review of Systems   All other systems reviewed and are negative.      Objective:      Physical Exam   Constitutional: He appears well-developed. No distress.   HENT:   Head: Normocephalic.   Eyes: EOM are normal. No scleral icterus.   Neck: Normal range of motion. No tracheal deviation present.   Cardiovascular: Normal rate, regular rhythm, normal heart sounds and intact distal pulses.   Pulmonary/Chest: Effort normal and breath sounds normal. No respiratory distress.   Abdominal: Soft. Bowel sounds are normal. He exhibits no distension.   Musculoskeletal: Normal range of motion. He exhibits no edema.   Neurological: He is alert.   Skin: Skin is warm and dry. No rash noted. He is not diaphoretic. No erythema.   Psychiatric: He has a normal mood and affect. His behavior is normal.   Vitals reviewed.      Assessment:       1. Routine general medical examination at a health care facility    2. Essential hypertension    3. CVD (cerebrovascular disease)    4. Recurrent major depressive disorder, in remission    5. Benzodiazepine dependence    6. Thrombocytopenia        Plan:       Herber was seen today for follow-up.    Diagnoses and all orders for this visit:    Routine general medical examination at a health care facility  -     Pneumococcal Polysaccharide Vaccine (23 Valent) (SQ/IM)    Essential hypertension   Well-cont    CVD (cerebrovascular disease)   Stable    Recurrent major depressive disorder, in remission   Well-cont    Benzodiazepine dependence   Monitor    Thrombocytopenia   monitor    Follow up in about 6 months (around 10/10/2019).

## 2019-04-11 ENCOUNTER — TELEPHONE (OUTPATIENT)
Dept: INTERNAL MEDICINE | Facility: CLINIC | Age: 84
End: 2019-04-11

## 2019-05-30 DIAGNOSIS — I10 ESSENTIAL HYPERTENSION: ICD-10-CM

## 2019-05-31 RX ORDER — AMLODIPINE BESYLATE 10 MG/1
TABLET ORAL
Qty: 90 TABLET | Refills: 0 | Status: SHIPPED | OUTPATIENT
Start: 2019-05-31 | End: 2019-08-28 | Stop reason: SDUPTHER

## 2019-08-03 PROBLEM — D63.1 ANEMIA DUE TO STAGE 3 CHRONIC KIDNEY DISEASE: Status: ACTIVE | Noted: 2019-08-03

## 2019-08-03 PROBLEM — N18.30 ANEMIA DUE TO STAGE 3 CHRONIC KIDNEY DISEASE: Status: ACTIVE | Noted: 2019-08-03

## 2019-08-28 DIAGNOSIS — I10 ESSENTIAL HYPERTENSION: ICD-10-CM

## 2019-08-29 RX ORDER — AMLODIPINE BESYLATE 10 MG/1
TABLET ORAL
Qty: 90 TABLET | Refills: 4 | Status: SHIPPED | OUTPATIENT
Start: 2019-08-29

## 2019-10-10 ENCOUNTER — OFFICE VISIT (OUTPATIENT)
Dept: INTERNAL MEDICINE | Facility: CLINIC | Age: 84
End: 2019-10-10
Payer: MEDICARE

## 2019-10-10 VITALS
OXYGEN SATURATION: 93 % | HEART RATE: 65 BPM | BODY MASS INDEX: 28.38 KG/M2 | SYSTOLIC BLOOD PRESSURE: 122 MMHG | HEIGHT: 68 IN | DIASTOLIC BLOOD PRESSURE: 72 MMHG | WEIGHT: 187.25 LBS

## 2019-10-10 DIAGNOSIS — F33.40 RECURRENT MAJOR DEPRESSIVE DISORDER, IN REMISSION: ICD-10-CM

## 2019-10-10 DIAGNOSIS — I67.9 CVD (CEREBROVASCULAR DISEASE): ICD-10-CM

## 2019-10-10 DIAGNOSIS — G47.33 OSA (OBSTRUCTIVE SLEEP APNEA): ICD-10-CM

## 2019-10-10 DIAGNOSIS — N18.30 CKD (CHRONIC KIDNEY DISEASE), STAGE III: ICD-10-CM

## 2019-10-10 DIAGNOSIS — M15.9 PRIMARY OSTEOARTHRITIS INVOLVING MULTIPLE JOINTS: ICD-10-CM

## 2019-10-10 DIAGNOSIS — E78.00 PURE HYPERCHOLESTEROLEMIA: ICD-10-CM

## 2019-10-10 DIAGNOSIS — I10 ESSENTIAL HYPERTENSION: Primary | ICD-10-CM

## 2019-10-10 PROCEDURE — 99213 OFFICE O/P EST LOW 20 MIN: CPT | Mod: PBBFAC,PN | Performed by: INTERNAL MEDICINE

## 2019-10-10 PROCEDURE — 99214 OFFICE O/P EST MOD 30 MIN: CPT | Mod: S$PBB,,, | Performed by: INTERNAL MEDICINE

## 2019-10-10 PROCEDURE — 99999 PR PBB SHADOW E&M-EST. PATIENT-LVL III: ICD-10-PCS | Mod: PBBFAC,,, | Performed by: INTERNAL MEDICINE

## 2019-10-10 PROCEDURE — 99214 PR OFFICE/OUTPT VISIT, EST, LEVL IV, 30-39 MIN: ICD-10-PCS | Mod: S$PBB,,, | Performed by: INTERNAL MEDICINE

## 2019-10-10 PROCEDURE — 99999 PR PBB SHADOW E&M-EST. PATIENT-LVL III: CPT | Mod: PBBFAC,,, | Performed by: INTERNAL MEDICINE

## 2019-10-10 NOTE — PROGRESS NOTES
Subjective:       Patient ID: Herber Peña is a 83 y.o. male.    Chief Complaint: Follow-up    HPI  Checkup.  Chart reviewed.  Weight stable.  No CP, SOB.  No dizziness or falls. Denies depression or anxiety. C/O arthralgias, mostly in hands.  Stiff in AM.  Review of Systems   All other systems reviewed and are negative.      Objective:      Physical Exam   Constitutional: He appears well-developed.   HENT:   Head: Normocephalic.   Eyes: EOM are normal.   Neck: Normal range of motion.   Cardiovascular: Normal rate, regular rhythm, normal heart sounds and intact distal pulses.   Pulmonary/Chest: Effort normal and breath sounds normal.   Abdominal: Soft. Bowel sounds are normal. He exhibits no distension. There is no tenderness.   Musculoskeletal: Normal range of motion. He exhibits no edema.   Neurological: He is alert.   Skin: Skin is warm and dry.   Psychiatric: He has a normal mood and affect.   Vitals reviewed.      Assessment:       1. Essential hypertension    2. CVD (cerebrovascular disease)    3. Recurrent major depressive disorder, in remission    4. ADY (obstructive sleep apnea)    5. Primary osteoarthritis involving multiple joints    6. CKD (chronic kidney disease), stage III    7. Pure hypercholesterolemia        Plan:       Herber was seen today for follow-up.    Diagnoses and all orders for this visit:    Essential hypertension   Well-cont    CVD (cerebrovascular disease)  -     CBC auto differential; Future  -     Lipid panel; Future    Recurrent major depressive disorder, in remission   Well-cont    ADY (obstructive sleep apnea)   Cont CPAP    Primary osteoarthritis involving multiple joints   ACTM 500 qid prn    CKD (chronic kidney disease), stage III  -     Comprehensive metabolic panel; Future    Pure hypercholesterolemia  -     Lipid panel; Future      Follow up in about 6 months (around 4/10/2020).

## 2020-03-30 ENCOUNTER — LAB VISIT (OUTPATIENT)
Dept: LAB | Facility: HOSPITAL | Age: 85
End: 2020-03-30
Attending: INTERNAL MEDICINE
Payer: MEDICARE

## 2020-03-30 DIAGNOSIS — E78.00 PURE HYPERCHOLESTEROLEMIA: ICD-10-CM

## 2020-03-30 DIAGNOSIS — I67.9 CVD (CEREBROVASCULAR DISEASE): ICD-10-CM

## 2020-03-30 DIAGNOSIS — N18.30 CKD (CHRONIC KIDNEY DISEASE), STAGE III: ICD-10-CM

## 2020-03-30 LAB
ALBUMIN SERPL BCP-MCNC: 4.3 G/DL (ref 3.5–5.2)
ALP SERPL-CCNC: 78 U/L (ref 38–126)
ALT SERPL W/O P-5'-P-CCNC: 32 U/L (ref 10–44)
ANION GAP SERPL CALC-SCNC: 7 MMOL/L (ref 8–16)
AST SERPL-CCNC: 34 U/L (ref 15–46)
BASOPHILS # BLD AUTO: 0.05 K/UL (ref 0–0.2)
BASOPHILS NFR BLD: 0.7 % (ref 0–1.9)
BILIRUB SERPL-MCNC: 0.4 MG/DL (ref 0.1–1)
BUN SERPL-MCNC: 20 MG/DL (ref 2–20)
CALCIUM SERPL-MCNC: 9.3 MG/DL (ref 8.7–10.5)
CHLORIDE SERPL-SCNC: 104 MMOL/L (ref 95–110)
CHOLEST SERPL-MCNC: 100 MG/DL (ref 120–199)
CHOLEST/HDLC SERPL: 3.4 {RATIO} (ref 2–5)
CO2 SERPL-SCNC: 31 MMOL/L (ref 23–29)
CREAT SERPL-MCNC: 1.07 MG/DL (ref 0.5–1.4)
DIFFERENTIAL METHOD: ABNORMAL
EOSINOPHIL # BLD AUTO: 0.3 K/UL (ref 0–0.5)
EOSINOPHIL NFR BLD: 3.9 % (ref 0–8)
ERYTHROCYTE [DISTWIDTH] IN BLOOD BY AUTOMATED COUNT: 12.4 % (ref 11.5–14.5)
EST. GFR  (AFRICAN AMERICAN): >60 ML/MIN/1.73 M^2
EST. GFR  (NON AFRICAN AMERICAN): >60 ML/MIN/1.73 M^2
GLUCOSE SERPL-MCNC: 118 MG/DL (ref 70–110)
HCT VFR BLD AUTO: 39.9 % (ref 40–54)
HDLC SERPL-MCNC: 29 MG/DL (ref 40–75)
HDLC SERPL: 29 % (ref 20–50)
HGB BLD-MCNC: 13.3 G/DL (ref 14–18)
IMM GRANULOCYTES # BLD AUTO: 0.02 K/UL (ref 0–0.04)
IMM GRANULOCYTES NFR BLD AUTO: 0.3 % (ref 0–0.5)
LDLC SERPL CALC-MCNC: 52.8 MG/DL (ref 63–159)
LYMPHOCYTES # BLD AUTO: 1.6 K/UL (ref 1–4.8)
LYMPHOCYTES NFR BLD: 22.1 % (ref 18–48)
MCH RBC QN AUTO: 31 PG (ref 27–31)
MCHC RBC AUTO-ENTMCNC: 33.3 G/DL (ref 32–36)
MCV RBC AUTO: 93 FL (ref 82–98)
MONOCYTES # BLD AUTO: 0.6 K/UL (ref 0.3–1)
MONOCYTES NFR BLD: 8.9 % (ref 4–15)
NEUTROPHILS # BLD AUTO: 4.6 K/UL (ref 1.8–7.7)
NEUTROPHILS NFR BLD: 64.1 % (ref 38–73)
NONHDLC SERPL-MCNC: 71 MG/DL
NRBC BLD-RTO: 0 /100 WBC
PLATELET # BLD AUTO: 142 K/UL (ref 150–350)
PMV BLD AUTO: 10.4 FL (ref 9.2–12.9)
POTASSIUM SERPL-SCNC: 4.4 MMOL/L (ref 3.5–5.1)
PROT SERPL-MCNC: 6.5 G/DL (ref 6–8.4)
RBC # BLD AUTO: 4.29 M/UL (ref 4.6–6.2)
SODIUM SERPL-SCNC: 142 MMOL/L (ref 136–145)
TRIGL SERPL-MCNC: 91 MG/DL (ref 30–150)
WBC # BLD AUTO: 7.2 K/UL (ref 3.9–12.7)

## 2020-03-30 PROCEDURE — 85025 COMPLETE CBC W/AUTO DIFF WBC: CPT | Mod: PO

## 2020-03-30 PROCEDURE — 80053 COMPREHEN METABOLIC PANEL: CPT | Mod: PO

## 2020-03-30 PROCEDURE — 80061 LIPID PANEL: CPT

## 2020-03-30 PROCEDURE — 36415 COLL VENOUS BLD VENIPUNCTURE: CPT | Mod: PO

## 2020-04-06 ENCOUNTER — TELEPHONE (OUTPATIENT)
Dept: FAMILY MEDICINE | Facility: CLINIC | Age: 85
End: 2020-04-06

## 2020-04-06 NOTE — TELEPHONE ENCOUNTER
----- Message from Deanna Villeda sent at 4/6/2020  2:43 PM CDT -----  Contact: self [phone]  Patient is calling for himself and his wife, Jose R Peña MRN [id number]. They are scheduled on 04/09 but wants to know if they should come in. They are concerned with the coronavirus. Please call and advise.

## 2020-04-09 ENCOUNTER — OFFICE VISIT (OUTPATIENT)
Dept: FAMILY MEDICINE | Facility: CLINIC | Age: 85
End: 2020-04-09
Payer: MEDICARE

## 2020-04-09 DIAGNOSIS — N18.30 ANEMIA DUE TO STAGE 3 CHRONIC KIDNEY DISEASE: ICD-10-CM

## 2020-04-09 DIAGNOSIS — F33.40 RECURRENT MAJOR DEPRESSIVE DISORDER, IN REMISSION: ICD-10-CM

## 2020-04-09 DIAGNOSIS — D63.1 ANEMIA DUE TO STAGE 3 CHRONIC KIDNEY DISEASE: ICD-10-CM

## 2020-04-09 DIAGNOSIS — E78.00 PURE HYPERCHOLESTEROLEMIA: ICD-10-CM

## 2020-04-09 DIAGNOSIS — M15.9 PRIMARY OSTEOARTHRITIS INVOLVING MULTIPLE JOINTS: Primary | ICD-10-CM

## 2020-04-09 DIAGNOSIS — D69.6 THROMBOCYTOPENIA: ICD-10-CM

## 2020-04-09 DIAGNOSIS — N18.30 CKD (CHRONIC KIDNEY DISEASE), STAGE III: ICD-10-CM

## 2020-04-09 DIAGNOSIS — F13.20 BENZODIAZEPINE DEPENDENCE: ICD-10-CM

## 2020-04-09 PROCEDURE — 99441 PR PHYSICIAN TELEPHONE EVALUATION 5-10 MIN: ICD-10-PCS | Mod: 95,,, | Performed by: INTERNAL MEDICINE

## 2020-04-09 PROCEDURE — 99441 PR PHYSICIAN TELEPHONE EVALUATION 5-10 MIN: CPT | Mod: 95,,, | Performed by: INTERNAL MEDICINE

## 2020-04-09 NOTE — PROGRESS NOTES
Subjective:       Patient ID: Herber Peña is a 84 y.o. male.    Chief Complaint: No chief complaint on file.    HPI  Phone visit.  Chart reviewed.  No CP, SOB.  Arthralgias controlled with ACTM prn.  No depression, anxiety controlled with Xanax 0.25 bid.  No other complaints.  Review of Systems   All other systems reviewed and are negative.      Objective:      Physical Exam   Neurological: He is alert.       Assessment:       1. Primary osteoarthritis involving multiple joints    2. Anemia due to stage 3 chronic kidney disease    3. Recurrent major depressive disorder, in remission    4. Pure hypercholesterolemia    5. Thrombocytopenia    6. Benzodiazepine dependence    7. CKD (chronic kidney disease), stage III        Plan:       Diagnoses and all orders for this visit:    Primary osteoarthritis involving multiple joints   ACTM prn    Anemia due to stage 3 chronic kidney disease   Improved    Recurrent major depressive disorder, in remission   Cont rx    Pure hypercholesterolemia   Well-cont    Thrombocytopenia    Benzodiazepine dependence   Monitor    CKD (chronic kidney disease), stage III   resolving    Follow up in about 6 months (around 10/9/2020).

## 2020-05-06 RX ORDER — HYDROCHLOROTHIAZIDE 25 MG/1
TABLET ORAL
Qty: 90 TABLET | Refills: 3 | Status: SHIPPED | OUTPATIENT
Start: 2020-05-06

## 2020-09-29 ENCOUNTER — PATIENT MESSAGE (OUTPATIENT)
Dept: OTHER | Facility: OTHER | Age: 85
End: 2020-09-29

## 2020-12-11 ENCOUNTER — PATIENT MESSAGE (OUTPATIENT)
Dept: OTHER | Facility: OTHER | Age: 85
End: 2020-12-11

## 2021-01-15 ENCOUNTER — IMMUNIZATION (OUTPATIENT)
Dept: FAMILY MEDICINE | Facility: CLINIC | Age: 86
End: 2021-01-15
Payer: MEDICARE

## 2021-01-15 DIAGNOSIS — Z23 NEED FOR VACCINATION: Primary | ICD-10-CM

## 2021-01-15 PROCEDURE — 91300 COVID-19, MRNA, LNP-S, PF, 30 MCG/0.3 ML DOSE VACCINE: CPT | Mod: PBBFAC | Performed by: FAMILY MEDICINE

## 2021-02-05 ENCOUNTER — IMMUNIZATION (OUTPATIENT)
Dept: FAMILY MEDICINE | Facility: CLINIC | Age: 86
End: 2021-02-05
Payer: MEDICARE

## 2021-02-05 DIAGNOSIS — Z23 NEED FOR VACCINATION: Primary | ICD-10-CM

## 2021-02-05 PROCEDURE — 0002A COVID-19, MRNA, LNP-S, PF, 30 MCG/0.3 ML DOSE VACCINE: CPT | Mod: PBBFAC | Performed by: NURSE ANESTHETIST, CERTIFIED REGISTERED

## 2021-02-05 PROCEDURE — 91300 COVID-19, MRNA, LNP-S, PF, 30 MCG/0.3 ML DOSE VACCINE: CPT | Mod: PBBFAC,PN | Performed by: NURSE ANESTHETIST, CERTIFIED REGISTERED

## 2021-05-03 ENCOUNTER — TELEPHONE (OUTPATIENT)
Dept: FAMILY MEDICINE | Facility: CLINIC | Age: 86
End: 2021-05-03

## 2021-10-04 ENCOUNTER — PATIENT MESSAGE (OUTPATIENT)
Dept: FAMILY MEDICINE | Facility: CLINIC | Age: 86
End: 2021-10-04